# Patient Record
Sex: FEMALE | Race: WHITE | Employment: OTHER | URBAN - NONMETROPOLITAN AREA
[De-identification: names, ages, dates, MRNs, and addresses within clinical notes are randomized per-mention and may not be internally consistent; named-entity substitution may affect disease eponyms.]

---

## 2023-01-10 ENCOUNTER — HOSPITAL ENCOUNTER (EMERGENCY)
Age: 68
Discharge: HOME | DRG: 641 | End: 2023-01-10
Attending: EMERGENCY MEDICINE
Payer: MEDICARE

## 2023-01-10 ENCOUNTER — APPOINTMENT (OUTPATIENT)
Dept: GENERAL RADIOLOGY | Age: 68
DRG: 641 | End: 2023-01-10
Payer: MEDICARE

## 2023-01-10 VITALS
RESPIRATION RATE: 14 BRPM | DIASTOLIC BLOOD PRESSURE: 73 MMHG | TEMPERATURE: 98.7 F | SYSTOLIC BLOOD PRESSURE: 122 MMHG | WEIGHT: 145 LBS | BODY MASS INDEX: 21.41 KG/M2 | HEART RATE: 75 BPM | OXYGEN SATURATION: 98 %

## 2023-01-10 DIAGNOSIS — S42.212A CLOSED FRACTURE OF NECK OF LEFT HUMERUS, INITIAL ENCOUNTER: Primary | ICD-10-CM

## 2023-01-10 PROCEDURE — 6360000002 HC RX W HCPCS

## 2023-01-10 PROCEDURE — 23600 CLTX PROX HUMRL FX W/O MNPJ: CPT

## 2023-01-10 PROCEDURE — 6370000000 HC RX 637 (ALT 250 FOR IP): Performed by: EMERGENCY MEDICINE

## 2023-01-10 PROCEDURE — 96374 THER/PROPH/DIAG INJ IV PUSH: CPT

## 2023-01-10 PROCEDURE — 73030 X-RAY EXAM OF SHOULDER: CPT

## 2023-01-10 PROCEDURE — 99284 EMERGENCY DEPT VISIT MOD MDM: CPT

## 2023-01-10 PROCEDURE — 99205 OFFICE O/P NEW HI 60 MIN: CPT

## 2023-01-10 RX ORDER — HYDROCODONE BITARTRATE AND ACETAMINOPHEN 5; 325 MG/1; MG/1
1 TABLET ORAL ONCE
Status: DISCONTINUED | OUTPATIENT
Start: 2023-01-10 | End: 2023-01-10

## 2023-01-10 RX ORDER — FENTANYL CITRATE 50 UG/ML
50 INJECTION, SOLUTION INTRAMUSCULAR; INTRAVENOUS ONCE
Status: COMPLETED | OUTPATIENT
Start: 2023-01-10 | End: 2023-01-10

## 2023-01-10 RX ORDER — HYDROCODONE BITARTRATE AND ACETAMINOPHEN 5; 325 MG/1; MG/1
1 TABLET ORAL EVERY 6 HOURS PRN
Status: DISCONTINUED | OUTPATIENT
Start: 2023-01-10 | End: 2023-01-11 | Stop reason: HOSPADM

## 2023-01-10 RX ORDER — HYDROCODONE BITARTRATE AND ACETAMINOPHEN 5; 325 MG/1; MG/1
1 TABLET ORAL EVERY 6 HOURS PRN
Qty: 15 TABLET | Refills: 0 | Status: ON HOLD | OUTPATIENT
Start: 2023-01-10 | End: 2023-01-12 | Stop reason: SINTOL

## 2023-01-10 RX ORDER — HYDROCODONE BITARTRATE AND ACETAMINOPHEN 5; 325 MG/1; MG/1
1 TABLET ORAL EVERY 6 HOURS PRN
Status: DISCONTINUED | OUTPATIENT
Start: 2023-01-10 | End: 2023-01-10

## 2023-01-10 RX ADMIN — FENTANYL CITRATE 50 MCG: 50 INJECTION, SOLUTION INTRAMUSCULAR; INTRAVENOUS at 20:48

## 2023-01-10 RX ADMIN — HYDROCODONE BITARTRATE AND ACETAMINOPHEN 1 TABLET: 5; 325 TABLET ORAL at 21:25

## 2023-01-10 ASSESSMENT — PAIN - FUNCTIONAL ASSESSMENT
PAIN_FUNCTIONAL_ASSESSMENT: 0-10
PAIN_FUNCTIONAL_ASSESSMENT: PREVENTS OR INTERFERES SOME ACTIVE ACTIVITIES AND ADLS
PAIN_FUNCTIONAL_ASSESSMENT: 0-10

## 2023-01-10 ASSESSMENT — PAIN SCALES - GENERAL
PAINLEVEL_OUTOF10: 9
PAINLEVEL_OUTOF10: 10
PAINLEVEL_OUTOF10: 8
PAINLEVEL_OUTOF10: 10

## 2023-01-10 ASSESSMENT — ENCOUNTER SYMPTOMS
VOMITING: 0
CONSTIPATION: 0
NAUSEA: 0
BLOOD IN STOOL: 0
ABDOMINAL PAIN: 0
RHINORRHEA: 0
SHORTNESS OF BREATH: 0
WHEEZING: 0
COUGH: 0
DIARRHEA: 0
EYE PAIN: 0

## 2023-01-10 ASSESSMENT — PAIN DESCRIPTION - ORIENTATION
ORIENTATION: LEFT
ORIENTATION: LEFT

## 2023-01-10 ASSESSMENT — PAIN DESCRIPTION - DESCRIPTORS: DESCRIPTORS: ACHING

## 2023-01-10 ASSESSMENT — PAIN DESCRIPTION - LOCATION
LOCATION: SHOULDER
LOCATION: SHOULDER

## 2023-01-10 ASSESSMENT — PAIN DESCRIPTION - FREQUENCY: FREQUENCY: CONTINUOUS

## 2023-01-10 ASSESSMENT — PAIN DESCRIPTION - PAIN TYPE: TYPE: ACUTE PAIN

## 2023-01-11 ENCOUNTER — HOSPITAL ENCOUNTER (INPATIENT)
Age: 68
LOS: 2 days | Discharge: HOME | DRG: 641 | End: 2023-01-13
Admitting: INTERNAL MEDICINE
Payer: MEDICARE

## 2023-01-11 ENCOUNTER — APPOINTMENT (OUTPATIENT)
Dept: CT IMAGING | Age: 68
DRG: 641 | End: 2023-01-11
Payer: MEDICARE

## 2023-01-11 ENCOUNTER — APPOINTMENT (OUTPATIENT)
Dept: MRI IMAGING | Age: 68
DRG: 641 | End: 2023-01-11
Payer: MEDICARE

## 2023-01-11 DIAGNOSIS — D50.9 IRON DEFICIENCY ANEMIA, UNSPECIFIED IRON DEFICIENCY ANEMIA TYPE: ICD-10-CM

## 2023-01-11 DIAGNOSIS — E87.1 HYPONATREMIA: Primary | ICD-10-CM

## 2023-01-11 DIAGNOSIS — R56.9 SEIZURE-LIKE ACTIVITY (HCC): ICD-10-CM

## 2023-01-11 PROBLEM — E87.20 METABOLIC ACIDOSIS: Status: ACTIVE | Noted: 2023-01-11

## 2023-01-11 LAB
AMPHETAMINE+METHAMPHETAMINE URINE SCREEN: NEGATIVE
ANION GAP SERPL CALCULATED.3IONS-SCNC: 16 MEQ/L (ref 8–16)
BACTERIA: ABNORMAL
BARBITURATE QUANTITATIVE URINE: NEGATIVE
BASOPHILS # BLD: 0.2 %
BASOPHILS ABSOLUTE: 0 THOU/MM3 (ref 0–0.1)
BENZODIAZEPINE QUANTITATIVE URINE: NEGATIVE
BILIRUBIN URINE: NEGATIVE
BLOOD, URINE: NEGATIVE
BUN BLDV-MCNC: 11 MG/DL (ref 7–22)
CALCIUM SERPL-MCNC: 8.7 MG/DL (ref 8.5–10.5)
CANNABINOID QUANTITATIVE URINE: NEGATIVE
CASTS: ABNORMAL /LPF
CASTS: ABNORMAL /LPF
CHARACTER, URINE: CLEAR
CHLORIDE BLD-SCNC: 81 MEQ/L (ref 98–111)
CO2: 22 MEQ/L (ref 23–33)
COCAINE METABOLITE QUANTITATIVE URINE: NEGATIVE
COLOR: YELLOW
CREAT SERPL-MCNC: 0.4 MG/DL (ref 0.4–1.2)
CREATININE URINE: 44.8 MG/DL
CRYSTALS: ABNORMAL
EOSINOPHIL # BLD: 0.1 %
EOSINOPHILS ABSOLUTE: 0 THOU/MM3 (ref 0–0.4)
EPITHELIAL CELLS, UA: ABNORMAL /HPF
ERYTHROCYTE [DISTWIDTH] IN BLOOD BY AUTOMATED COUNT: 12.1 % (ref 11.5–14.5)
ERYTHROCYTE [DISTWIDTH] IN BLOOD BY AUTOMATED COUNT: 42.5 FL (ref 35–45)
FENTANYL: POSITIVE
GFR SERPL CREATININE-BSD FRML MDRD: > 60 ML/MIN/1.73M2
GLUCOSE BLD-MCNC: 106 MG/DL (ref 70–108)
GLUCOSE, URINE: NEGATIVE MG/DL
HCT VFR BLD CALC: 32.7 % (ref 37–47)
HEMOGLOBIN: 11.7 GM/DL (ref 12–16)
IMMATURE GRANS (ABS): 0.1 THOU/MM3 (ref 0–0.07)
IMMATURE GRANULOCYTES: 1 %
KETONES, URINE: 80
LEUKOCYTE ESTERASE, URINE: ABNORMAL
LYMPHOCYTES # BLD: 6.5 %
LYMPHOCYTES ABSOLUTE: 0.6 THOU/MM3 (ref 1–4.8)
MCH RBC QN AUTO: 34.1 PG (ref 26–33)
MCHC RBC AUTO-ENTMCNC: 35.8 GM/DL (ref 32.2–35.5)
MCV RBC AUTO: 95.3 FL (ref 81–99)
MISCELLANEOUS LAB TEST RESULT: ABNORMAL
MONOCYTES # BLD: 6.9 %
MONOCYTES ABSOLUTE: 0.7 THOU/MM3 (ref 0.4–1.3)
NITRITE, URINE: NEGATIVE
NUCLEATED RED BLOOD CELLS: 0 /100 WBC
OPIATES, URINE: NEGATIVE
OSMOLALITY CALCULATION: 240.2 MOSMOL/KG (ref 275–300)
OSMOLALITY URINE: 351 MOSMOL/KG (ref 250–750)
OSMOLALITY URINE: 598 MOSMOL/KG (ref 250–750)
OSMOLALITY: 256 MOSMOL/KG (ref 275–295)
OXYCODONE: NEGATIVE
PH UA: 5 (ref 5–9)
PHENCYCLIDINE QUANTITATIVE URINE: NEGATIVE
PLATELET # BLD: 303 THOU/MM3 (ref 130–400)
PMV BLD AUTO: 9.6 FL (ref 9.4–12.4)
POTASSIUM SERPL-SCNC: 3.6 MEQ/L (ref 3.5–5.2)
PROTEIN UA: NEGATIVE MG/DL
RBC # BLD: 3.43 MILL/MM3 (ref 4.2–5.4)
RBC URINE: ABNORMAL /HPF
RENAL EPITHELIAL, UA: ABNORMAL
SEG NEUTROPHILS: 85.3 %
SEGMENTED NEUTROPHILS ABSOLUTE COUNT: 8.4 THOU/MM3 (ref 1.8–7.7)
SODIUM BLD-SCNC: 119 MEQ/L (ref 135–145)
SODIUM BLD-SCNC: 121 MEQ/L (ref 135–145)
SODIUM BLD-SCNC: 121 MEQ/L (ref 135–145)
SODIUM BLD-SCNC: 122 MEQ/L (ref 135–145)
SODIUM BLD-SCNC: 122 MEQ/L (ref 135–145)
SODIUM URINE: 35 MEQ/L
SODIUM URINE: 40 MEQ/L
SPECIFIC GRAVITY UA: 1.02 (ref 1–1.03)
TOTAL CK: 103 U/L (ref 30–135)
TROPONIN T: < 0.01 NG/ML
TSH SERPL DL<=0.05 MIU/L-ACNC: 2.25 UIU/ML (ref 0.4–4.2)
UROBILINOGEN, URINE: 0.2 EU/DL (ref 0–1)
WBC # BLD: 9.9 THOU/MM3 (ref 4.8–10.8)
WBC UA: ABNORMAL /HPF
YEAST: ABNORMAL

## 2023-01-11 PROCEDURE — 6360000002 HC RX W HCPCS

## 2023-01-11 PROCEDURE — 82550 ASSAY OF CK (CPK): CPT

## 2023-01-11 PROCEDURE — 93005 ELECTROCARDIOGRAM TRACING: CPT | Performed by: NURSE PRACTITIONER

## 2023-01-11 PROCEDURE — 6360000002 HC RX W HCPCS: Performed by: SOCIAL WORKER

## 2023-01-11 PROCEDURE — 83930 ASSAY OF BLOOD OSMOLALITY: CPT

## 2023-01-11 PROCEDURE — 84300 ASSAY OF URINE SODIUM: CPT

## 2023-01-11 PROCEDURE — 84484 ASSAY OF TROPONIN QUANT: CPT

## 2023-01-11 PROCEDURE — 99223 1ST HOSP IP/OBS HIGH 75: CPT

## 2023-01-11 PROCEDURE — 84443 ASSAY THYROID STIM HORMONE: CPT

## 2023-01-11 PROCEDURE — 93010 ELECTROCARDIOGRAM REPORT: CPT | Performed by: NUCLEAR MEDICINE

## 2023-01-11 PROCEDURE — 6820000001 HC L2 TRAUMA SURGERY EVALUATION: Performed by: SURGERY

## 2023-01-11 PROCEDURE — 84295 ASSAY OF SERUM SODIUM: CPT

## 2023-01-11 PROCEDURE — 99285 EMERGENCY DEPT VISIT HI MDM: CPT

## 2023-01-11 PROCEDURE — 82570 ASSAY OF URINE CREATININE: CPT

## 2023-01-11 PROCEDURE — 80048 BASIC METABOLIC PNL TOTAL CA: CPT

## 2023-01-11 PROCEDURE — 2580000003 HC RX 258: Performed by: NURSE PRACTITIONER

## 2023-01-11 PROCEDURE — 2580000003 HC RX 258

## 2023-01-11 PROCEDURE — 6370000000 HC RX 637 (ALT 250 FOR IP)

## 2023-01-11 PROCEDURE — 81001 URINALYSIS AUTO W/SCOPE: CPT

## 2023-01-11 PROCEDURE — 2580000003 HC RX 258: Performed by: SOCIAL WORKER

## 2023-01-11 PROCEDURE — 70553 MRI BRAIN STEM W/O & W/DYE: CPT

## 2023-01-11 PROCEDURE — 95819 EEG AWAKE AND ASLEEP: CPT

## 2023-01-11 PROCEDURE — 36415 COLL VENOUS BLD VENIPUNCTURE: CPT

## 2023-01-11 PROCEDURE — 6360000004 HC RX CONTRAST MEDICATION: Performed by: SOCIAL WORKER

## 2023-01-11 PROCEDURE — A9579 GAD-BASE MR CONTRAST NOS,1ML: HCPCS | Performed by: SOCIAL WORKER

## 2023-01-11 PROCEDURE — 85025 COMPLETE CBC W/AUTO DIFF WBC: CPT

## 2023-01-11 PROCEDURE — 83935 ASSAY OF URINE OSMOLALITY: CPT

## 2023-01-11 PROCEDURE — 1200000003 HC TELEMETRY R&B

## 2023-01-11 PROCEDURE — 70450 CT HEAD/BRAIN W/O DYE: CPT

## 2023-01-11 PROCEDURE — 80307 DRUG TEST PRSMV CHEM ANLYZR: CPT

## 2023-01-11 PROCEDURE — 99223 1ST HOSP IP/OBS HIGH 75: CPT | Performed by: INTERNAL MEDICINE

## 2023-01-11 RX ORDER — POLYETHYLENE GLYCOL 3350 17 G/17G
17 POWDER, FOR SOLUTION ORAL DAILY PRN
Status: DISCONTINUED | OUTPATIENT
Start: 2023-01-11 | End: 2023-01-13 | Stop reason: HOSPADM

## 2023-01-11 RX ORDER — SODIUM CHLORIDE 0.9 % (FLUSH) 0.9 %
5-40 SYRINGE (ML) INJECTION EVERY 12 HOURS SCHEDULED
Status: DISCONTINUED | OUTPATIENT
Start: 2023-01-11 | End: 2023-01-13 | Stop reason: HOSPADM

## 2023-01-11 RX ORDER — SODIUM CHLORIDE 9 MG/ML
INJECTION, SOLUTION INTRAVENOUS CONTINUOUS
Status: DISCONTINUED | OUTPATIENT
Start: 2023-01-11 | End: 2023-01-11

## 2023-01-11 RX ORDER — SODIUM CHLORIDE 0.9 % (FLUSH) 0.9 %
5-40 SYRINGE (ML) INJECTION PRN
Status: DISCONTINUED | OUTPATIENT
Start: 2023-01-11 | End: 2023-01-13 | Stop reason: HOSPADM

## 2023-01-11 RX ORDER — LOSARTAN POTASSIUM AND HYDROCHLOROTHIAZIDE 25; 100 MG/1; MG/1
1 TABLET ORAL DAILY
Status: DISCONTINUED | OUTPATIENT
Start: 2023-01-11 | End: 2023-01-12

## 2023-01-11 RX ORDER — POTASSIUM CHLORIDE 20 MEQ/1
40 TABLET, EXTENDED RELEASE ORAL PRN
Status: DISCONTINUED | OUTPATIENT
Start: 2023-01-11 | End: 2023-01-13 | Stop reason: HOSPADM

## 2023-01-11 RX ORDER — ENOXAPARIN SODIUM 100 MG/ML
40 INJECTION SUBCUTANEOUS EVERY 24 HOURS
Status: DISCONTINUED | OUTPATIENT
Start: 2023-01-11 | End: 2023-01-13 | Stop reason: HOSPADM

## 2023-01-11 RX ORDER — CETIRIZINE HYDROCHLORIDE 10 MG/1
10 TABLET ORAL DAILY
Status: DISCONTINUED | OUTPATIENT
Start: 2023-01-11 | End: 2023-01-13 | Stop reason: HOSPADM

## 2023-01-11 RX ORDER — SODIUM CHLORIDE 9 MG/ML
INJECTION, SOLUTION INTRAVENOUS PRN
Status: DISCONTINUED | OUTPATIENT
Start: 2023-01-11 | End: 2023-01-13 | Stop reason: HOSPADM

## 2023-01-11 RX ORDER — ACETAMINOPHEN 325 MG/1
650 TABLET ORAL EVERY 6 HOURS PRN
Status: DISCONTINUED | OUTPATIENT
Start: 2023-01-11 | End: 2023-01-13 | Stop reason: HOSPADM

## 2023-01-11 RX ORDER — ASPIRIN 81 MG/1
81 TABLET ORAL DAILY
Status: DISCONTINUED | OUTPATIENT
Start: 2023-01-11 | End: 2023-01-13 | Stop reason: HOSPADM

## 2023-01-11 RX ORDER — MAGNESIUM SULFATE IN WATER 40 MG/ML
2000 INJECTION, SOLUTION INTRAVENOUS PRN
Status: DISCONTINUED | OUTPATIENT
Start: 2023-01-11 | End: 2023-01-13 | Stop reason: HOSPADM

## 2023-01-11 RX ORDER — ONDANSETRON 2 MG/ML
4 INJECTION INTRAMUSCULAR; INTRAVENOUS EVERY 6 HOURS PRN
Status: DISCONTINUED | OUTPATIENT
Start: 2023-01-11 | End: 2023-01-13 | Stop reason: HOSPADM

## 2023-01-11 RX ORDER — CARVEDILOL 3.12 MG/1
3.12 TABLET ORAL 2 TIMES DAILY WITH MEALS
Status: DISCONTINUED | OUTPATIENT
Start: 2023-01-11 | End: 2023-01-13 | Stop reason: HOSPADM

## 2023-01-11 RX ORDER — FERROUS SULFATE 325(65) MG
325 TABLET ORAL
Status: DISCONTINUED | OUTPATIENT
Start: 2023-01-11 | End: 2023-01-13 | Stop reason: HOSPADM

## 2023-01-11 RX ORDER — ATORVASTATIN CALCIUM 20 MG/1
20 TABLET, FILM COATED ORAL NIGHTLY
Status: DISCONTINUED | OUTPATIENT
Start: 2023-01-11 | End: 2023-01-13 | Stop reason: HOSPADM

## 2023-01-11 RX ORDER — ISOSORBIDE MONONITRATE 30 MG/1
15 TABLET, EXTENDED RELEASE ORAL DAILY
Status: DISCONTINUED | OUTPATIENT
Start: 2023-01-11 | End: 2023-01-13 | Stop reason: HOSPADM

## 2023-01-11 RX ORDER — SODIUM CHLORIDE 9 MG/ML
INJECTION, SOLUTION INTRAVENOUS CONTINUOUS
Status: DISCONTINUED | OUTPATIENT
Start: 2023-01-11 | End: 2023-01-12

## 2023-01-11 RX ORDER — LORAZEPAM 2 MG/ML
1 INJECTION INTRAMUSCULAR EVERY 5 MIN PRN
Status: DISCONTINUED | OUTPATIENT
Start: 2023-01-11 | End: 2023-01-13 | Stop reason: HOSPADM

## 2023-01-11 RX ORDER — POTASSIUM CHLORIDE 7.45 MG/ML
10 INJECTION INTRAVENOUS PRN
Status: DISCONTINUED | OUTPATIENT
Start: 2023-01-11 | End: 2023-01-13 | Stop reason: HOSPADM

## 2023-01-11 RX ORDER — KETOROLAC TROMETHAMINE 30 MG/ML
15 INJECTION, SOLUTION INTRAMUSCULAR; INTRAVENOUS EVERY 6 HOURS PRN
Status: DISCONTINUED | OUTPATIENT
Start: 2023-01-11 | End: 2023-01-13 | Stop reason: HOSPADM

## 2023-01-11 RX ORDER — ACETAMINOPHEN 650 MG/1
650 SUPPOSITORY RECTAL EVERY 6 HOURS PRN
Status: DISCONTINUED | OUTPATIENT
Start: 2023-01-11 | End: 2023-01-13 | Stop reason: HOSPADM

## 2023-01-11 RX ORDER — ONDANSETRON 4 MG/1
4 TABLET, ORALLY DISINTEGRATING ORAL EVERY 8 HOURS PRN
Status: DISCONTINUED | OUTPATIENT
Start: 2023-01-11 | End: 2023-01-13 | Stop reason: HOSPADM

## 2023-01-11 RX ADMIN — ACETAMINOPHEN 650 MG: 325 TABLET ORAL at 20:31

## 2023-01-11 RX ADMIN — GADOTERIDOL 15 ML: 279.3 INJECTION, SOLUTION INTRAVENOUS at 17:27

## 2023-01-11 RX ADMIN — KETOROLAC TROMETHAMINE 15 MG: 30 INJECTION, SOLUTION INTRAMUSCULAR; INTRAVENOUS at 15:57

## 2023-01-11 RX ADMIN — ISOSORBIDE MONONITRATE 15 MG: 30 TABLET, EXTENDED RELEASE ORAL at 18:07

## 2023-01-11 RX ADMIN — FERROUS SULFATE TAB 325 MG (65 MG ELEMENTAL FE) 325 MG: 325 (65 FE) TAB at 20:31

## 2023-01-11 RX ADMIN — CARVEDILOL 3.12 MG: 3.12 TABLET, FILM COATED ORAL at 18:07

## 2023-01-11 RX ADMIN — LEVETIRACETAM 1250 MG: 100 INJECTION, SOLUTION INTRAVENOUS at 20:43

## 2023-01-11 RX ADMIN — SODIUM CHLORIDE: 9 INJECTION, SOLUTION INTRAVENOUS at 11:47

## 2023-01-11 RX ADMIN — ENOXAPARIN SODIUM 40 MG: 100 INJECTION SUBCUTANEOUS at 18:07

## 2023-01-11 RX ADMIN — SODIUM CHLORIDE: 9 INJECTION, SOLUTION INTRAVENOUS at 17:49

## 2023-01-11 RX ADMIN — ASPIRIN 81 MG: 81 TABLET, COATED ORAL at 18:07

## 2023-01-11 ASSESSMENT — PAIN DESCRIPTION - LOCATION
LOCATION: SHOULDER;BACK
LOCATION: SHOULDER
LOCATION: SHOULDER

## 2023-01-11 ASSESSMENT — ENCOUNTER SYMPTOMS
SHORTNESS OF BREATH: 0
TROUBLE SWALLOWING: 0
WHEEZING: 0
CHEST TIGHTNESS: 0
NAUSEA: 0
ABDOMINAL PAIN: 0
COUGH: 0
CONSTIPATION: 0
ABDOMINAL DISTENTION: 0
STRIDOR: 0
COLOR CHANGE: 0
VOMITING: 0
PHOTOPHOBIA: 0
BACK PAIN: 1
DIARRHEA: 0
NAUSEA: 1

## 2023-01-11 ASSESSMENT — PAIN - FUNCTIONAL ASSESSMENT
PAIN_FUNCTIONAL_ASSESSMENT: NONE - DENIES PAIN
PAIN_FUNCTIONAL_ASSESSMENT: PREVENTS OR INTERFERES SOME ACTIVE ACTIVITIES AND ADLS
PAIN_FUNCTIONAL_ASSESSMENT: 0-10

## 2023-01-11 ASSESSMENT — PAIN DESCRIPTION - DESCRIPTORS
DESCRIPTORS: ACHING
DESCRIPTORS: SHARP;SORE

## 2023-01-11 ASSESSMENT — PAIN DESCRIPTION - ORIENTATION
ORIENTATION: LEFT

## 2023-01-11 ASSESSMENT — PAIN SCALES - GENERAL
PAINLEVEL_OUTOF10: 9
PAINLEVEL_OUTOF10: 0
PAINLEVEL_OUTOF10: 7
PAINLEVEL_OUTOF10: 8

## 2023-01-11 NOTE — PROGRESS NOTES
[facility] Laboratory Technician Worksheet      EEG Date: 2023    Name: Carole Cheney   : 1955   Age: 74 y.o. SEX: female    ROOM: 4A0 MRN: [id number]           CSN: [id number]      Ordering Provider: TRACI rodriguez  EEG Number: [id number] Time of Test:  0002    Hand: Right   Sedation: no    H.V. Done: No  age protocol  Photic: Yes    Sleep: Yes  Drowsy: Yes   Sleep Deprived: No    Seizures observed: no    Mentality: alert      Clinical History:patient had an episode where she went stiff and bit tongue, episode lasted about 5-10 mins according to family and was confused 15-20 mins afterward. Patient had a fall yesterday and broke left shoulder. CT of the head 2023  Impression    No evidence of an acute process.    MRI of the brain-  Pending       Past Medical History:       Diagnosis Date    Anemia     Heart disease     Hypertension        Scheduled Meds:   aspirin  81 mg Oral Daily    carvedilol  3.125 mg Oral BID WC    ferrous sulfate  325 mg Oral Once per day on     cetirizine  10 mg Oral Daily    [Held by provider] losartan-hydroCHLOROthiazide  1 tablet Oral Daily    isosorbide mononitrate  15 mg Oral Daily    sodium chloride flush  5-40 mL IntraVENous 2 times per day    enoxaparin  40 mg SubCUTAneous Q24H    atorvastatin  20 mg Oral Nightly     Continuous Infusions:   sodium chloride Stopped (23 1539)    sodium chloride       PRN Meds:.sodium chloride flush, sodium chloride, ondansetron **OR** ondansetron, polyethylene glycol, acetaminophen **OR** acetaminophen, potassium chloride **OR** potassium alternative oral replacement **OR** potassium chloride, magnesium sulfate, ketorolac, LORazepam    Technician: Jody Chawla 2023

## 2023-01-11 NOTE — CONSULTS
Neurology Consult Note    Date:1/11/2023       ERHX:[id number]  Patient Margarette Chong     YOB: 1955     Age:67 y.o. Requesting Physician: SIVA Yeh*     Reason for Consult:  Evaluate for Seizure      Chief Complaint: Loss of consciousness, stiffening episode    Subjective     Chandu Hernández is a 56-year-old  female with past medical history significant for anemia, heart disease, hypertension and a fracture of her left humerus on 1/10/2023 who presented to [facility] emergency department after an episode in which she was observed by her  to have a seizure.  states that they were sleeping in the recliners and he first heard her icepack fall on the floor, when he lifted her she was lying with her right arm and right leg straightened and stiff with eyes rolled back in her head and unresponsive to him. He states that this episode lasted for 5 to 10 minutes and was followed by a 20 to 30-minute period of confusion, weakness. Patient did not lose control of her bowel/bladder, she denies muscle soreness but she does have a right lateral tongue bite.  also states that about a year ago patient was observed to have an episode of unilateral shaking and loss of responsiveness while she was cleaning her blinds. This lasted for only seconds and she was back to normal quickly after that episode so she did not seek evaluation at that time. Patient denies recent illness or head trauma. ED course was significant for finding of hyponatremia to 119 on arrival.      Review of Systems   Review of Systems   Constitutional:  Negative for activity change, fatigue and fever. HENT:  Negative for tinnitus and trouble swallowing. Eyes:  Negative for photophobia and visual disturbance. Respiratory:  Negative for cough and shortness of breath. Cardiovascular:  Negative for chest pain and palpitations.    Gastrointestinal:  Negative for diarrhea, nausea and vomiting. Genitourinary:  Negative for dysuria and flank pain. Musculoskeletal:  Positive for arthralgias and joint swelling. Negative for neck pain and neck stiffness. Skin:  Negative for color change and rash. Neurological:  Positive for seizures and weakness. Negative for dizziness, facial asymmetry, speech difficulty, numbness and headaches. Psychiatric/Behavioral:  Positive for confusion. Negative for agitation. Medications   Scheduled Meds:    aspirin  81 mg Oral Daily    carvedilol  3.125 mg Oral BID WC    ferrous sulfate  325 mg Oral Once per day on Mon Wed Fri    cetirizine  10 mg Oral Daily    [Held by provider] losartan-hydroCHLOROthiazide  1 tablet Oral Daily    isosorbide mononitrate  15 mg Oral Daily    sodium chloride flush  5-40 mL IntraVENous 2 times per day    enoxaparin  40 mg SubCUTAneous Q24H    atorvastatin  20 mg Oral Nightly     Continuous Infusions:    sodium chloride 50 mL/hr at 01/11/23 1749    sodium chloride       PRN Meds: sodium chloride flush, sodium chloride, ondansetron **OR** ondansetron, polyethylene glycol, acetaminophen **OR** acetaminophen, potassium chloride **OR** potassium alternative oral replacement **OR** potassium chloride, magnesium sulfate, ketorolac, LORazepam    Past History    Past Medical History:   has a past medical history of Anemia, Heart disease, and Hypertension. Social History:   reports that she quit smoking about 9 years ago. Her smoking use included cigarettes. She has a 15.00 pack-year smoking history. She has never used smokeless tobacco. She reports current alcohol use. She reports that she does not use drugs.      Family History:   Family History   Problem Relation Age of Onset    Heart Disease Father     Arthritis Mother     High Blood Pressure Mother     Rectal Cancer Mother 80       Physical Examination      Vitals:  BP (!) 140/80   Pulse 85   Temp 98.4 °F (36.9 °C) (Oral)   Resp 15   Ht 5' 8\" (1.727 m) Wt 145 lb (65.8 kg)   SpO2 100%   BMI 22.05 kg/m²   Temp (24hrs), Av.3 °F (36.8 °C), Min:97.8 °F (36.6 °C), Max:98.7 °F (37.1 °C)      I/O (24Hr): No intake or output data in the 24 hours ending 236    Physical Exam  Vitals reviewed. Constitutional:       Appearance: Normal appearance. HENT:      Head: Normocephalic and atraumatic. Right Ear: External ear normal.      Left Ear: External ear normal.      Nose: Nose normal.      Mouth/Throat:      Mouth: Mucous membranes are moist.      Pharynx: Oropharynx is clear. Eyes:      Extraocular Movements: Extraocular movements intact and EOM normal.      Pupils: Pupils are equal, round, and reactive to light. Cardiovascular:      Rate and Rhythm: Normal rate and regular rhythm. Pulmonary:      Effort: Pulmonary effort is normal. No respiratory distress. Abdominal:      General: There is no distension. Palpations: Abdomen is soft. Tenderness: There is no abdominal tenderness. Musculoskeletal:         General: Swelling, tenderness and signs of injury present. No deformity. Cervical back: No rigidity or tenderness. Comments: LUE humerus   Skin:     General: Skin is warm and dry. Neurological:      Mental Status: She is alert and oriented to person, place, and time. Coordination: Heel to Shin Test normal. Finger-nose-finger test: Normal on right, deferred on left. Gait: Gait is intact. Deep Tendon Reflexes:      Reflex Scores:       Bicep reflexes are 2+ on the right side. Brachioradialis reflexes are 2+ on the right side and 2+ on the left side. Patellar reflexes are 2+ on the right side and 2+ on the left side. Psychiatric:         Mood and Affect: Mood normal.         Speech: Speech normal.         Behavior: Behavior normal.         Thought Content: Thought content normal.     Neurologic Exam     Mental Status   Oriented to person, place, and time. Follows 2 step commands.    Attention: normal.   Speech: speech is normal   Level of consciousness: alert  Knowledge: good. Able to name object. Able to read. Able to repeat. Cranial Nerves     CN II   Visual fields full to confrontation. CN III, IV, VI   Pupils are equal, round, and reactive to light. Extraocular motions are normal.     CN V   Facial sensation intact. CN VII   Facial expression full, symmetric. CN VIII   CN VIII normal.   Hearing: intact    CN IX, X   CN IX normal.   Palate: symmetric    CN XI   CN XI normal.   Right sternocleidomastoid strength: normal  Left sternocleidomastoid strength: normal  Right trapezius strength: normal  Left trapezius strength: normal    CN XII   CN XII normal.   Tongue: not atrophic    Motor Exam   Muscle bulk: normal  Overall muscle tone: normal  Strength:  RUE 5/5  LUE 5/5 , other strength testing deferred due to humeral fracture. BLE 5/5  No abnormal motor movement     Sensory Exam   Light touch normal.     Gait, Coordination, and Reflexes     Gait  Gait: normal    Coordination   Finger-nose-finger test: Normal on right, deferred on left. Heel to shin coordination: normal    Tremor   Resting tremor: absent  Intention tremor: absent  Action tremor: absent    Reflexes   Right brachioradialis: 2+  Left brachioradialis: 2+  Right biceps: 2+  Right patellar: 2+  Left patellar: 2+     Labs/Imaging/Diagnostics   Labs:  CBC:  Recent Labs     01/11/23  0930   WBC 9.9   RBC 3.43*   HGB 11.7*   HCT 32.7*   MCV 95.3        CHEMISTRIES:  Recent Labs     01/11/23  0930 01/11/23  1500   * 122*   K 3.6  --    CL 81*  --    CO2 22*  --    BUN 11  --    CREATININE 0.4  --    GLUCOSE 106  --      PT/INR:No results for input(s): PROTIME, INR in the last 72 hours. APTT:No results for input(s): APTT in the last 72 hours. LIVER PROFILE:No results for input(s): AST, ALT, BILIDIR, BILITOT, ALKPHOS in the last 72 hours.     Imaging Last 24 Hours:  CT HEAD WO CONTRAST    Result Date: 1/11/2023  PROCEDURE: CT HEAD WO CONTRAST CLINICAL INFORMATION: Siecure like activity. Negin Randolph. COMPARISON: No prior study. TECHNIQUE: Noncontrast 5 mm axial images were obtained through the brain. Sagittal and coronal reconstructions were obtained. All CT scans at this facility use dose modulation, iterative reconstruction, and/or weight-based dosing when appropriate to reduce radiation dose to as low as reasonably achievable. FINDINGS: The brain volume is within acceptable limits. There is no hemorrhage. There are no intra-or extra-axial collections. There is no hydrocephalus, midline shift or mass effect. The gray-white matter differentiation is preserved. The paranasal sinuses and mastoid air cells are normally aerated. There is no suspicious calvarial abnormality. No evidence of an acute process. **This report has been created using voice recognition software. It may contain minor errors which are inherent in voice recognition technology. ** Final report electronically signed by Dr. Cecil Fox on 1/11/2023 11:16 AM    XR SHOULDER LEFT (MIN 2 VIEWS)    Result Date: 1/10/2023  PROCEDURE: XR SHOULDER LEFT (MIN 2 VIEWS) CLINICAL INFORMATION: Injury, Pain. COMPARISON: No prior study. TECHNIQUE: 3 views of the shoulder including AP view, a glenoid view, and a scapular Y view. FINDINGS: There is a comminuted fracture involving the head and neck of the humerus. There is degenerative change involving the acromioclavicular joint. The remaining bony structures are intact. The clavicle is normal.  The soft tissues are normal.  There are no suspicious findings in the visualized aspects of the upper lung. 1. Comminuted fracture involving the head and neck of the left humerus. 2. Degenerative change involving the acromioclavicular joint. **This report has been created using voice recognition software. It may contain minor errors which are inherent in voice recognition technology. ** Final report electronically signed by DR Adelina Mcdermott on 1/10/2023 7:40 PM    MRI BRAIN W WO CONTRAST    Result Date: 1/11/2023  PROCEDURE: MRI BRAIN W WO CONTRAST CLINICAL INFORMATIONNew Onset Seizure. COMPARISON: CT scan of the brain dated 11 January 2023. TECHNIQUE: Multiplanar and multiple spin echo T1 and T2-weighted images were obtained through the brain before and after the administration of intravenous contrast. FINDINGS: The diffusion-weighted images are normal. The brain volume is reduced. There are no intra-or extra-axial collections. There is no hydrocephalus, midline shift or mass effect. On the FLAIR and T2-weighted sequences, there is no structural abnormality noted in the temporal lobes. There are punctate areas of increased signal intensity in the white matter most likely representing ischemic changes. . On the gradient echo T2-weighted images, there are no areas of susceptibility artifact noted There is no abnormal enhancement in the brain. The major intracranial vascular flow voids are present. The midline craniocervical junction structures are normal.  The brainstem and pituitary gland are normal.     1. No evidence for an acute infarct. 2. Mild atrophy and probable ischemic changes in the white matter. 3. No definite structural abnormality noted and temporal lobes. **This report has been created using voice recognition software. It may contain minor errors which are inherent in voice recognition technology. ** Final report electronically signed by DR Adelina Mcdermott on 1/11/2023 5:36 PM        Assessment and Plan:        Hospital Problems             Last Modified POA    * (Principal) Seizure ([facility] [address] 1/11/2023 Yes       Seizure, recurrent  CTH no ICH, midline shift, mass-effect  MRI brain with and without contrast-no acute infarct, enhancing lesions, temporal lobes appear normal  EEG completed pending read  Keppra 1250 mg IV tonight  Keppra 750 mg p.o. starting tomorrow morning  Discussed with pharmacist.  Saul Varma less likely to cause hyponatremia compared to other antiepileptics and is a good option. Hyponatremia managed per nephrology  Recommend infectious work-up per primary team  Neurology following for EEG results  Will need follow up with outpatient general neurology Dr. Tato Penn in 2-4 weeks. Discussed seizure precautions: No driving, climbing ladders, swimming for 6 months or until released by outpatient neurology. Electronically signed by Fiorella Tirado PA-C on 1/11/23 at 6:36 PM EST    I have independently reviewed the hospital record and examined this patient on 1/11/23. I have discussed my findings with Fiorella Tirado PA-C. I have personally seen and examined this patient and the treatment plan was developed by me and outlined in this note by Fiorella Tirado PA-C. The timing of the note filing does not necessarily correlate with the timing of when the patient was seen by me.      Tori Ward MD

## 2023-01-11 NOTE — ED TRIAGE NOTES
Patient to room in wheelchair. C/o left shoulder pain, radiating to left elbow beginning prior to arrival. States her left leg gave out, cause her to fall to the hardwood will in her home. Denies loss of consciousness. Denies head injury.

## 2023-01-11 NOTE — PROGRESS NOTES
Pt admitted to  [address] [place] [address]. Complaints: seizure like activity. IV site free of s/s of infection or infiltration. Vital signs obtained. Assessment and data collection initiated. Two nurse skin assessment performed by Flako Arnold. Oriented to room. Policies and procedures for 4A explained. All questions answered with no further questions at this time. Fall prevention and safety brochure discussed with patient. Bed alarm on. Call light in reach.       ME@ 1/11/2023 3:16 PM

## 2023-01-11 NOTE — ED NOTES
Patient up to [facility] to collect urine sample via assist x2. Patient complains of back pain. Patient back to bed. VSS. Hospitalist at bedside for patient assessment.       Ivonne Hampton RN  01/11/23 [id number]

## 2023-01-11 NOTE — H&P
Hospitalist History & Physical    Patient:  Brittany Smith    Unit/Bed:35/035A  YOB: 1955  MRN: [id number]   Acct: [de-identified]   PCP: SIVA Romero CNP  Code Status: No Order    Date of Service: Pt seen/examined on 01/11/23 and admitted to Inpatient with expected LOS greater than two midnights due to medical therapy. Chief Complaint: Seizure    Assessment/Plan:    Probable Seizure: Head CT negative; noted wound to tongue without pain  Neurology consulted; recommendations appreciated  PRN ativan for seizure control  Seizure precautions    Hyponatremia: Na 119  Hold hyzaar for now  Q4h neuro checks  NS @125; notify if Na raises more than 8 meq per 24hrs  2L fluid restriction  Na q2h check  Nephrology consulted; appreciate recommendations    Left should pain: X-ray (1/10/23) showed comminuted fracture of head and neck of humerus on shoulder x-ray  Pain control with Toradol; avoid narcotics due to neuro checks  Ortho consult when stable. Hypertension: medication controlled. Hold hyzaar    hyperlipidemia: Patient states she only takes her statin once a week per primary cardiologist    Non-anion gap metabolic acidosis: serum bicarb 22; anion gap 16   Daily bmp    ETOH use: Patient endorses 3 alcoholic drinks daily for years. She denies any withdrawal and stated that there have been times that she has quit drinking for days without any ill effects. PAWss score was insignificant. Monitor for signs of withdrawal. May consider CIWAA if needed  Counseled on quitting and not receptive at this time. History of Present Illness:  Brittany Smith is a 74 y.o. female with PMHx of hypertension, hyperlipidemia, and a recent history of left shoulder fracture who presented to [facility] with chief complaint of seizure like activity. This am at 0400 the patients  stated that he was sleeping in the chair next to the patient and she asked for more pain medications.  He got up to go get the pain medications and when he returned the patient was still laying on her chair, but was very stiff and had bit her tongue. She never lost bowel or bladder control, but he was uncertain or not if she had a seizure. After the event the  noted that she was not very \"with it\", but recovered within 15-20 minutes. Subsequently she was brought to the ED. Patient does endorse a history of drinking 3 alcoholic drinks daily, but denies any history of withdrawal.    Review of Systems: Review of Systems   Constitutional:  Negative for chills, diaphoresis and fever. Respiratory:  Negative for shortness of breath, wheezing and stridor. Cardiovascular:  Negative for chest pain. Gastrointestinal:  Positive for nausea. Musculoskeletal:  Positive for neck pain. Negative for gait problem. Neurological:  Negative for tremors, light-headedness, numbness and headaches. Past Medical History:        Diagnosis Date    Anemia     Heart disease     Hypertension        Past Surgical History:        Procedure Laterality Date    CARDIAC CATHETERIZATION  2017       Home Medications:   No current facility-administered medications on file prior to encounter. Current Outpatient Medications on File Prior to Encounter   Medication Sig Dispense Refill    HYDROcodone-acetaminophen (NORCO) 5-325 MG per tablet Take 1 tablet by mouth every 6 hours as needed for Pain for up to 15 doses. Max Daily Amount: 4 tablets 15 tablet 0    atorvastatin (LIPITOR) 20 MG tablet Take 20 mg by mouth daily      carvedilol (COREG) 3.125 MG tablet Take 3.125 mg by mouth 2 times daily (with meals)      Isosorbide Mononitrate (IMDUR PO) Take 15 mg by mouth daily      fexofenadine (ALLEGRA) 180 MG tablet Take 180 mg by mouth daily      losartan-hydrochlorothiazide (HYZAAR) 100-25 MG per tablet Take 1 tablet by mouth daily      polyethylene glycol (GLYCOLAX) powder Colonoscopy Prep Dispense 255 Gram Bottle.   Use as Directed 255 g 0    ferrous sulfate 325 (65 FE) MG EC tablet Take 325 mg by mouth three times a week      naproxen (NAPROSYN) 500 MG tablet Take 1 tablet by mouth 2 times daily for 14 days. 28 tablet 0    vitamin E 400 UNIT capsule Take 400 Units by mouth daily. calcium carbonate (OSCAL) 500 MG TABS tablet Take 600 mg by mouth daily       aspirin 81 MG tablet Take 81 mg by mouth daily. multivitamin (THERAGRAN) per tablet Take 1 tablet by mouth daily. Allergies:    Patient has no known allergies. Social History:    reports that she quit smoking about 9 years ago. Her smoking use included cigarettes. She has a 15.00 pack-year smoking history. She has never used smokeless tobacco. She reports current alcohol use. She reports that she does not use drugs. Family History:       Problem Relation Age of Onset    Heart Disease Father     Arthritis Mother     High Blood Pressure Mother     Rectal Cancer Mother 80       Diet:  No diet orders on file      Physical Exam:  /76   Pulse 82   Temp 97.8 °F (36.6 °C) (Oral)   Resp 20   Ht 5' 9\" (1.753 m)   Wt 145 lb (65.8 kg)   SpO2 98%   BMI 21.41 kg/m²   General:   Non-toxic  HEENT:  normocephalic and atraumatic. No scleral icterus. PERR. +laceration on tongue  Neck: supple. No JVD. No thyromegaly. Lungs: clear to auscultation. No retractions  Cardiac: RRR without murmur. Abdomen: soft. Nontender. Bowel sounds positive. Extremities:  No clubbing, cyanosis, or edema x 4. Left upper extremity in sling  Vasculature: capillary refill < 3 seconds. Palpable LE pulses bilaterally. Skin:  warm and dry. No rashes or lesions. Psych:  Alert and oriented x3. Affect appropriate  Lymph:  No supraclavicular adenopathy. Neurologic:  No focal deficit. No seizures.     Data: (All radiographs, tracings, PFTs, and imaging are personally viewed and interpreted unless otherwise noted)  Labs:   Recent Labs     01/11/23  0930   WBC 9.9   HGB 11.7*   HCT 32.7*        Recent Labs     01/11/23  0930   *   K 3.6   CL 81*   CO2 22*   BUN 11   CREATININE 0.4   CALCIUM 8.7     No results for input(s): AST, ALT, BILIDIR, BILITOT, ALKPHOS in the last 72 hours. No results for input(s): INR in the last 72 hours. Recent Labs     01/11/23  0930   CKTOTAL 103     Urinalysis:   Lab Results   Component Value Date/Time    BLOODU mod 08/04/2014 04:29 PM    SPECGRAV 1.010 08/04/2014 04:29 PM    GLUCOSEU neg 08/04/2014 04:29 PM       EKG: normal sinus rhythm, no blocks or conduction defects, no ischemic changes    Radiology:  CT HEAD WO CONTRAST   Final Result    No evidence of an acute process. **This report has been created using voice recognition software. It may contain minor errors which are inherent in voice recognition technology. **      Final report electronically signed by Dr. Carmenza Paredes on 1/11/2023 11:16 AM        CT HEAD WO CONTRAST    Result Date: 1/11/2023  PROCEDURE: CT HEAD WO CONTRAST CLINICAL INFORMATION: Siecure like activity. Devendra Linda. COMPARISON: No prior study. TECHNIQUE: Noncontrast 5 mm axial images were obtained through the brain. Sagittal and coronal reconstructions were obtained. All CT scans at this facility use dose modulation, iterative reconstruction, and/or weight-based dosing when appropriate to reduce radiation dose to as low as reasonably achievable. FINDINGS: The brain volume is within acceptable limits. There is no hemorrhage. There are no intra-or extra-axial collections. There is no hydrocephalus, midline shift or mass effect. The gray-white matter differentiation is preserved. The paranasal sinuses and mastoid air cells are normally aerated. There is no suspicious calvarial abnormality. No evidence of an acute process. **This report has been created using voice recognition software. It may contain minor errors which are inherent in voice recognition technology. ** Final report electronically signed by Dr. Carmenza Paredes on 1/11/2023 11:16 AM        Tele:   [x] yes             [] no      Thank you SIVA Humphrey CNP for the opportunity to be involved in this patient's care.     Electronically signed by SIVA Card CNP on 1/11/2023 at 12:38 PM

## 2023-01-11 NOTE — ED TRIAGE NOTES
Patient to ED via [facility] c/c seizures. Upon arrival to ED, patient is alert and oriented. Family at bedside reports patient was sitting in a recliner chair when \"her body went straight and stiff and she vomited blood. \" Patient reports no hx of seizures. Was seen here yesterday with a broken and dislocated left shoulder. Patient was scheduled to go to [facility] this afternoon. Patient is quiet. Patient denies pain, feeling fatigued or foggy. States she is trying to figure out what is happening. EKG completed. IV access established and labs collected.

## 2023-01-11 NOTE — ED PROVIDER NOTES
[address] [facility] [address] [address] EMERGENCY DEPT      EMERGENCY MEDICINE     Pt Name: Skylar Aguero  MRN: [id number]  [place] 1955  Date of evaluation: 1/10/2023  Resident Physician: Ever Rodriguez DPM  Supervising Physician: Denilson Rodriguez DO    CHIEF COMPLAINT       Chief Complaint   Patient presents with    Shoulder Pain     Left       HISTORY OF PRESENT ILLNESS   Skylar Aguero is a pleasant 74 y.o. female who presents to the emergency department from as a transfer from, by private vehicle for evaluation of left shoulder pain. States that she fell on her shoulder about 40 minutes ago onto hardwood floor at home. Went to urgent care and was transferred to ED for further evaluation. Denies any head injury or loss of consciousness. States that her sensation is intact, able to move fingers. Xrays taken in urgent care showed comminuted fractures of the head and neck of the humerus. Urgent care attempted to administer sling and swath but patient was unable to tolerate due to pain. Denies any nausea, vomiting, fever, chills, chest pain, or shortness of breath. No other acute complaints. PASTMEDICAL HISTORY     Past Medical History:   Diagnosis Date    Anemia     Heart disease     Hypertension        Patient Active Problem List   Diagnosis Code    Hypertension I10    Family history of early CAD-her father has MI at age 62  Z80.55    Abnormal EKG non specific T wave changes R94.31    Abnormal nuclear stress test- MILD TO MOD APICAL REVERSIBILITY- MED rX FOR AS SHE HAS NO SYMPTOMS R94.39     SURGICAL HISTORY       Past Surgical History:   Procedure Laterality Date    CARDIAC CATHETERIZATION  2017       CURRENT MEDICATIONS       Previous Medications    ASPIRIN 81 MG TABLET    Take 81 mg by mouth daily.     ATORVASTATIN (LIPITOR) 20 MG TABLET    Take 20 mg by mouth daily    CALCIUM CARBONATE (OSCAL) 500 MG TABS TABLET    Take 600 mg by mouth daily     CARVEDILOL (COREG) 3.125 MG TABLET    Take 3.125 mg by mouth 2 times daily (with meals) FERROUS SULFATE 325 (65 FE) MG EC TABLET    Take 325 mg by mouth three times a week    FEXOFENADINE (ALLEGRA) 180 MG TABLET    Take 180 mg by mouth daily    ISOSORBIDE MONONITRATE (IMDUR PO)    Take 15 mg by mouth daily    LOSARTAN-HYDROCHLOROTHIAZIDE (HYZAAR) 100-25 MG PER TABLET    Take 1 tablet by mouth daily    MULTIVITAMIN (THERAGRAN) PER TABLET    Take 1 tablet by mouth daily. NAPROXEN (NAPROSYN) 500 MG TABLET    Take 1 tablet by mouth 2 times daily for 14 days. POLYETHYLENE GLYCOL (GLYCOLAX) POWDER    Colonoscopy Prep Dispense 255 Gram Bottle. Use as Directed    VITAMIN E 400 UNIT CAPSULE    Take 400 Units by mouth daily. ALLERGIES     has No Known Allergies. FAMILY HISTORY     She indicated that her mother is alive. She indicated that her father is . SOCIAL HISTORY       Social History     Tobacco Use    Smoking status: Former     Packs/day: 0.50     Years: 30.00     Pack years: 15.00     Types: Cigarettes     Quit date:      Years since quittin.0    Smokeless tobacco: Never    Tobacco comments:     printed material to AVS and per provider   Substance Use Topics    Alcohol use: Yes     Alcohol/week: 0.0 standard drinks     Comment: occ    Drug use: No       PHYSICAL EXAM       ED Triage Vitals [01/10/23 1859]   BP Temp Temp Source Heart Rate Resp SpO2 Height Weight   (!) 170/87 98.7 °F (37.1 °C) Temporal 76 20 97 % -- 145 lb (65.8 kg)       Additional Vital Signs:  Vitals:    01/10/23 2051   BP: (!) 129/59   Pulse: 77   Resp: 16   Temp:    SpO2: 97%     Physical Exam  Vitals and nursing note reviewed. Constitutional:       Appearance: Normal appearance. HENT:      Head: Normocephalic and atraumatic. Eyes:      Extraocular Movements: Extraocular movements intact. Cardiovascular:      Rate and Rhythm: Normal rate and regular rhythm. Pulmonary:      Effort: Pulmonary effort is normal.      Breath sounds: Normal breath sounds.    Abdominal:      Palpations: Abdomen is soft. Musculoskeletal:      Left shoulder: Swelling and tenderness present. Left upper arm: Deformity and tenderness present. Cervical back: Normal range of motion. Skin:     General: Skin is warm and dry. Capillary Refill: Capillary refill takes less than 2 seconds. Neurological:      Mental Status: She is alert and oriented to person, place, and time. Psychiatric:         Mood and Affect: Mood normal.         Behavior: Behavior normal.       FORMAL DIAGNOSTIC RESULTS     RADIOLOGY: Interpretation per the Radiologist below, if available at the time of this note (none if blank):    XR SHOULDER LEFT (MIN 2 VIEWS)   Final Result       1. Comminuted fracture involving the head and neck of the left humerus. 2. Degenerative change involving the acromioclavicular joint. **This report has been created using voice recognition software. It may contain minor errors which are inherent in voice recognition technology. **      Final report electronically signed by DR Terra Almaraz on 1/10/2023 7:40 PM          LABS: (none if blank)  Labs Reviewed - No data to display    (Any cultures that may have been sent were not resulted at the time of this patient visit)    [address] / ED COURSE:     1) Number and Complexity of Problems            Problem List This Visit:         Chief Complaint   Patient presents with    Shoulder Pain     Left              Differential Diagnosis includes (but not limited to):  Fracture, dislocation, sprain, strain             Pertinent Comorbid Conditions:        2)  Data Reviewed (none if left blank)          My Independent interpretations:     EKG:          Imaging: Comminuted fracture of head and neck of humerus on shoulder XR    Labs:                       Decision Rules/Clinical Scores utilized:              External Documentation Reviewed:         Previous patient encounter documents & history available on EMR was reviewed              See Formal Diagnostic Results above for the lab and radiology tests and orders. 3)  Treatment and Disposition         ED Reassessment:  stable ED stay         Case discussed with consulting clinician:           Shared Decision-Making was performed and disposition discussed with the        Patient/Family and questions answered          Social determinants of health impacting treatment or disposition:           Code Status:        Summary of Patient Presentation: This 74year old presents with complaints of left shoulder pain. Comminuted fracture on head and neck of humerus on shoulder xray. Fentanyl and norco given. Arm sling applied, patient was able to tolerate. Sling and arm fracture patient information dispensed. Follow up outpatient with [facility] at 12:10pm tomorrow for evaluation. MDM  Number of Diagnoses or Management Options  Closed fracture of neck of left humerus, initial encounter: new, needed workup     Amount and/or Complexity of Data Reviewed  Tests in the radiology section of CPT®: reviewed  Review and summarize past medical records: yes  Independent visualization of images, tracings, or specimens: yes    /   Vitals Reviewed:    Vitals:    01/10/23 1859 01/10/23 2007 01/10/23 2051   BP: (!) 170/87 (!) 170/94 (!) 129/59   Pulse: 76  77   Resp: 20 18 16   Temp: 98.7 °F (37.1 °C)     TempSrc: Temporal     SpO2: 97%  97%   Weight: 145 lb (65.8 kg)         The patient was seen and examined. Appropriate diagnostic testing was performed and results reviewed with the patient. The results of pertinent diagnostic studies and exam findings were discussed. The patients provisional diagnosis and plan of care were discussed with the patient and present family who expressed understanding. Any medications were reviewed and indications and risks of medications were discussed with the patient /family present.  Strict verbal and written return precautions, instructions and appropriate follow-up provided to  the patient. ED Medications administered this visit:  (None if blank)  Medications   HYDROcodone-acetaminophen (NORCO) 5-325 MG per tablet 1 tablet (1 tablet Oral Given 1/10/23 2125)   fentaNYL (SUBLIMAZE) injection 50 mcg (50 mcg IntraVENous Given 1/10/23 2048)         PROCEDURES: (None if blank)  Procedures:     CRITICAL CARE: (None if blank)      DISCHARGE PRESCRIPTIONS: (None if blank)  New Prescriptions    HYDROCODONE-ACETAMINOPHEN (NORCO) 5-325 MG PER TABLET    Take 1 tablet by mouth every 6 hours as needed for Pain for up to 15 doses. Max Daily Amount: 4 tablets       FINAL IMPRESSION      1.  Closed fracture of neck of left humerus, initial encounter          DISPOSITION/PLAN   DISPOSITION Decision To Discharge 01/10/2023 09:29:50 PM        OUTPATIENT FOLLOW UP THE PATIENT:  [facility]  [address]  [phone]  [phone]  Go in 1 day  Appointment at 12:10pm for humerus fracture      MAGED Davye DPM  Resident  01/10/23 3420

## 2023-01-11 NOTE — PROGRESS NOTES
Patient released in stable condition. Instructed to go directly to [facility] emergency department for further evaluation and treatment. Instructed to remain NPO until seen by emergency room provider. Patient verbalized understanding.

## 2023-01-11 NOTE — CONSULTS
[facility]          [facility]        [address]        [place], [address]        [phone]           Inpatient Initial consult note         1/11/2023 3:19 PM      Patient Name:   Chichi Thacker  YOB: 1955  Primary Care Physician:  SIVA Buck CNP   Admit Date:    1/11/2023  9:14 AM    Consultation requested by : SIVA Garduno*    Reason for Consult : Nephrology following for hyponatremia. History of presenting illness :Chichi Thacker is a 74 y.o.   female with Past Medical History as stated below presented with a chief complaint of Seizures  on 1/11/2023. Patient states that she was in her usual state of health until yesterday when she felt her leg give out while walking in her kitchen and she fell onto her shoulder and back. She had no head trauma or LOC. She presented to the ED at that time and was found to have a left humeral neck fracture. She was then sent home with follow-up scheduled for today with [facility]. This morning, she was seated in her living room when her  witnessed her body become rigid, her eyes roll back in her head, her slide down in her chair, and bleeding in/from her mouth. EMS was called and she was brought to [facility] ED. She denies any memory of this morning's events with her first awareness taking place on the way in with EMS. Endorses prior history of leg weakness for which she sees a chiropractor. She denies any new changes in medications, supplements, or diet. States that she has been working on eating a healthier diet over the past year and has been drinking more water with an intentional weight loss of 8lbs over 6 months. Current smoker, no history of alcohol use disorder or other drug use.  Denies fever/chills, headache, vision changes, dizziness or lightheadedness prior to today, chest pain, palpitations, dyspnea, abdominal pain, changes in bowels, urgency, frequency, dysuria, polyuria, skin changes, or edema. Past History:  Past Medical History:   Diagnosis Date    Anemia     Heart disease     Hypertension      Past Surgical History:   Procedure Laterality Date    CARDIAC CATHETERIZATION  2017     Social History     Socioeconomic History    Marital status:      Spouse name: Not on file    Number of children: Not on file    Years of education: Not on file    Highest education level: Not on file   Occupational History    Not on file   Tobacco Use    Smoking status: Former     Packs/day: 0.50     Years: 30.00     Pack years: 15.00     Types: Cigarettes     Quit date:      Years since quittin.0    Smokeless tobacco: Never    Tobacco comments:     printed material to AVS and per provider   Substance and Sexual Activity    Alcohol use: Yes     Alcohol/week: 0.0 standard drinks     Comment: occ    Drug use: No    Sexual activity: Not on file   Other Topics Concern    Not on file   Social History Narrative    Not on file     Social Determinants of Health     Financial Resource Strain: Not on file   Food Insecurity: Not on file   Transportation Needs: Not on file   Physical Activity: Not on file   Stress: Not on file   Social Connections: Not on file   Intimate Partner Violence: Not on file   Housing Stability: Not on file     Family History   Problem Relation Age of Onset    Heart Disease Father     Arthritis Mother     High Blood Pressure Mother     Rectal Cancer Mother 80       Medications & Allergies :  Prior to Admission medications    Medication Sig Start Date End Date Taking? Authorizing Provider   HYDROcodone-acetaminophen (NORCO) 5-325 MG per tablet Take 1 tablet by mouth every 6 hours as needed for Pain for up to 15 doses.  Max Daily Amount: 4 tablets 1/10/23 1/13/23  Raymundo Son DO   atorvastatin (LIPITOR) 20 MG tablet Take 20 mg by mouth daily    Historical Provider, MD   carvedilol (COREG) 3.125 MG tablet Take 3.125 mg by mouth 2 times daily (with meals)    Historical Provider, MD   Isosorbide Mononitrate (IMDUR PO) Take 15 mg by mouth daily    Historical Provider, MD   fexofenadine (ALLEGRA) 180 MG tablet Take 180 mg by mouth daily    Historical Provider, MD   losartan-hydrochlorothiazide (HYZAAR) 100-25 MG per tablet Take 1 tablet by mouth daily    Historical Provider, MD   polyethylene glycol (GLYCOLAX) powder Colonoscopy Prep Dispense 255 Gram Bottle. Use as Directed 8/16/17   Tammy Beavers MD   ferrous sulfate 325 (65 FE) MG EC tablet Take 325 mg by mouth three times a week    Historical Provider, MD   naproxen (NAPROSYN) 500 MG tablet Take 1 tablet by mouth 2 times daily for 14 days. 4/10/15 4/24/15  SIVA White - CNP   vitamin E 400 UNIT capsule Take 400 Units by mouth daily. Historical Provider, MD   calcium carbonate (OSCAL) 500 MG TABS tablet Take 600 mg by mouth daily     Historical Provider, MD   aspirin 81 MG tablet Take 81 mg by mouth daily. Historical Provider, MD   multivitamin [facility]) per tablet Take 1 tablet by mouth daily. Historical Provider, MD     Allergies: Patient has no known allergies. IP meds : Scheduled Meds:   aspirin  81 mg Oral Daily    carvedilol  3.125 mg Oral BID WC    ferrous sulfate  325 mg Oral Once per day on Mon Wed Fri    cetirizine  10 mg Oral Daily    [Held by provider] losartan-hydroCHLOROthiazide  1 tablet Oral Daily    isosorbide mononitrate  15 mg Oral Daily    sodium chloride flush  5-40 mL IntraVENous 2 times per day    enoxaparin  40 mg SubCUTAneous Q24H    atorvastatin  20 mg Oral Nightly     Continuous Infusions:   [Held by provider] sodium chloride 125 mL/hr at 01/11/23 1445    sodium chloride         Review of Systems  Review of Systems   Constitutional:  Negative for appetite change, chills, fatigue and fever. Eyes:  Negative for visual disturbance. Respiratory:  Negative for chest tightness and shortness of breath. Cardiovascular:  Negative for chest pain, palpitations and leg swelling. Gastrointestinal:  Negative for abdominal distention, abdominal pain, constipation, diarrhea, nausea and vomiting. Genitourinary:  Negative for difficulty urinating, dysuria, flank pain, frequency, hematuria and urgency. Musculoskeletal:  Positive for arthralgias (from recent fall - known left humeral neck fracture) and back pain (from recent fall). Skin:  Negative for color change and rash. Neurological:  Positive for weakness (leg weakness yesterday resulting in fall). Negative for dizziness, syncope, light-headedness and headaches. Physical Exam  Physical Exam  Constitutional:       General: She is not in acute distress. Appearance: She is normal weight. She is ill-appearing. HENT:      Head: Normocephalic and atraumatic. Right Ear: External ear normal.      Left Ear: External ear normal.      Nose: Nose normal.      Mouth/Throat:      Mouth: Mucous membranes are moist.      Tongue: Lesions (bite lesion to right lateral tongue) present. Tongue does not deviate from midline. Pharynx: Oropharynx is clear. Eyes:      General: No scleral icterus. Extraocular Movements: Extraocular movements intact. Conjunctiva/sclera: Conjunctivae normal.   Cardiovascular:      Rate and Rhythm: Normal rate and regular rhythm. Pulses: Normal pulses. Heart sounds: Normal heart sounds. No murmur heard. Pulmonary:      Effort: Pulmonary effort is normal.      Breath sounds: Normal breath sounds. No wheezing, rhonchi or rales. Abdominal:      General: Abdomen is flat. Bowel sounds are normal. There is no distension. Palpations: Abdomen is soft. There is no mass. Tenderness: There is no abdominal tenderness. There is no guarding. Musculoskeletal:         General: Signs of injury (Left arm in sling d/t recent humeral neck fracture) present. Cervical back: Normal range of motion and neck supple. Skin:     General: Skin is warm and dry.       Capillary Refill: Capillary refill takes less than 2 seconds. Findings: No rash. Neurological:      General: No focal deficit present. Mental Status: She is alert and oriented to person, place, and time. Cranial Nerves: No cranial nerve deficit. Sensory: No sensory deficit. Motor: No weakness. Psychiatric:         Mood and Affect: Mood normal.         Behavior: Behavior normal.         Thought Content: Thought content normal.        BP (!) 140/80   Pulse 85   Temp 98.4 °F (36.9 °C) (Oral)   Resp 15   Ht 5' 9\" (1.753 m)   Wt 145 lb (65.8 kg)   SpO2 100%   BMI 21.41 kg/m²   Labs, Radiology and Tests :  CBC:   Recent Labs     01/11/23  0930   WBC 9.9   HGB 11.7*   HCT 32.7*        CMP:  Recent Labs     01/11/23  0930   *   K 3.6   CL 81*   CO2 22*   BUN 11   CREATININE 0.4   GLUCOSE 106   CALCIUM 8.7     Hepatic: No results for input(s): LABALBU, AST, ALT, ALB, BILITOT, ALKPHOS in the last 72 hours. Radiology :    Old lab data have been reviewed and noted patient's baseline creatinine is around 0.4-0.6. Other / Echocardiogram: Last echo on file [id number] showed LVEF 60%, normal LV systolic function, and no regional wall motion abnormalities. Assessment and Plan:  Electrolytes -   Hyponatremia, severe with seizure-like activity, likely secondary to diuretics and/or polydipsia but further labs will help direct course. Unknown acuity. Appears euvolemic on exam, however hyaline casts suggest dehydration. Na 119 on presentation to ED, repeat Na 122 at 15:00. Calculated serum osm 240.2, Urine sodium 40, TSH 2.25, Glc 106. UA with specific gravity 1.021, ketones 80, pH 5.0. Started on NS 125ml/h in ED, held and restarted at 50ml/h. Further urine studies pending.   Labs:   lab contacted to add on urine osm and measured serum osm to initial blood and urine samples taken on presentation to ED  Serial sodium check every 2 hours pending clinical course  BMP daily   Continue NS 50ml/h   Strict I/Os  Do not correct by >0.5mEq/L/h or >6-8mEq/L/day. Neuro checks  Seizure-like activity, witnessed. No history of seizures/seizure disorders. CTH unremarkable. Neurology following, EEG today, MRI brain ordered. Non-anion gap metabolic acidosis. Serum bicarb 22 on presentation. Cl 81. AG 16. Consider RTA. Monitor with daily BMP  Iron deficiency anemia. On iron supplementation. HTN, primary. On losartan-hctz, carvedilol, and isosorbide mononitrate outpatient. Holding losartan-hctz in setting of hyponatremia. Continue to hold diuretics  HLD, on statin. Case discussed with Dr. Char Reyes. Jodi Arteaga MD, [id number]  [facility]    This report has been created using voice recognition software.  It may contain minor errors which are inherent in voice recognition technology

## 2023-01-11 NOTE — ED NOTES
Pt alert and oriented. Pt denies head injury or LOC. Pt denies numbness or tingling in left hand. Pt has ice on left shoulder and has limited mobility of left arm.       Margarita Ellsworth RN  01/10/23 2017

## 2023-01-11 NOTE — ED NOTES
ED to inpatient nurses report    Chief Complaint   Patient presents with    Seizures      Present to ED from home  LOC: alert and orientated to name, place, date  Vital signs   Vitals:    01/11/23 0920 01/11/23 1211   BP: (!) 158/83 138/76   Pulse: 91 82   Resp: 20 20   Temp: 97.8 °F (36.6 °C)    TempSrc: Oral    SpO2: 93% 98%   Weight: 145 lb (65.8 kg)    Height: 5' 9\" (1.753 m)       Oxygen Baseline RA    Current needs required 2L nasal cannula Bipap/Cpap No  LDAs:   Peripheral IV 01/11/23 Right Antecubital (Active)   Site Assessment Clean, dry & intact 01/11/23 0932   Line Status Brisk blood return;Flushed;Specimen collected;Normal saline locked 01/11/23 0932   Dressing Status New dressing applied;Clean, dry & intact 01/11/23 0932   Dressing Type Transparent 01/11/23 0932   Dressing Intervention New 01/11/23 0932     Mobility: Requires assistance * 1  Pending ED orders: None  Present condition: STABLE, back pain causes difficulty moving    Electronically signed by Rosamaria Felix RN on 1/11/2023 at 12:30 PM     Rosamaria Felix RN  01/11/23 1235

## 2023-01-11 NOTE — ED NOTES
Pt given Norco prescription from pharmacy to dispense home. RN provided medication education. Pt and spouse verbalized understanding.       Beatriz Vásquez RN  01/10/23 1533

## 2023-01-11 NOTE — PROGRESS NOTES
Attempted admission with  [facility] via telephone, no answer. VM left to call back prior to 630 pm EST today. PT currently sleeping, not disturbed.  Message to [facility]

## 2023-01-11 NOTE — ED PROVIDER NOTES
[address] [facility] Encounter      279 [facility]       Chief Complaint   Patient presents with    Shoulder Pain     Left          Nurses Notes reviewed and I agree except as noted in the HPI. HISTORY OF PRESENT ILLNESS   Jasper Mo is a 74 y.o. female who presents to urgent care with complaint of left shoulder pain following a mechanical fall just prior to arrival.  Patient states she is having difficulty moving her shoulder and has a significant amount of pain. There is deformity present. Patient denies other injuries including hitting her head. On examination patient is able to wiggle her fingers without difficulty. Sensation is intact to the tip of her fingers. Cap refill is less than 2 seconds and radial pulse is 2+. Patient is able to move the arm at the elbow, but is unable to move the shoulder itself due to pain. REVIEW OF SYSTEMS     Review of Systems   Constitutional:  Negative for appetite change, chills, fatigue, fever and unexpected weight change. HENT:  Negative for ear pain and rhinorrhea. Eyes:  Negative for pain and visual disturbance. Respiratory:  Negative for cough, shortness of breath and wheezing. Cardiovascular:  Negative for chest pain, palpitations and leg swelling. Gastrointestinal:  Negative for abdominal pain, blood in stool, constipation, diarrhea, nausea and vomiting. Genitourinary:  Negative for dysuria, frequency and hematuria. Musculoskeletal:  Positive for arthralgias (pain following fall on left shoulder). Negative for joint swelling and neck stiffness. Skin:  Negative for rash. Neurological:  Negative for dizziness, syncope, weakness, light-headedness and headaches. Hematological:  Does not bruise/bleed easily.      PAST MEDICAL HISTORY         Diagnosis Date    Anemia     Heart disease     Hypertension        SURGICAL HISTORY     Patient  has a past surgical history that includes Cardiac catheterization (2017). CURRENT MEDICATIONS       Previous Medications    ASPIRIN 81 MG TABLET    Take 81 mg by mouth daily. ATORVASTATIN (LIPITOR) 20 MG TABLET    Take 20 mg by mouth daily    CALCIUM CARBONATE (OSCAL) 500 MG TABS TABLET    Take 600 mg by mouth daily     CARVEDILOL (COREG) 3.125 MG TABLET    Take 3.125 mg by mouth 2 times daily (with meals)    FERROUS SULFATE 325 (65 FE) MG EC TABLET    Take 325 mg by mouth three times a week    FEXOFENADINE (ALLEGRA) 180 MG TABLET    Take 180 mg by mouth daily    ISOSORBIDE MONONITRATE (IMDUR PO)    Take 15 mg by mouth daily    LOSARTAN-HYDROCHLOROTHIAZIDE (HYZAAR) 100-25 MG PER TABLET    Take 1 tablet by mouth daily    MULTIVITAMIN (THERAGRAN) PER TABLET    Take 1 tablet by mouth daily. NAPROXEN (NAPROSYN) 500 MG TABLET    Take 1 tablet by mouth 2 times daily for 14 days. POLYETHYLENE GLYCOL (GLYCOLAX) POWDER    Colonoscopy Prep Dispense 255 Gram Bottle. Use as Directed    VITAMIN E 400 UNIT CAPSULE    Take 400 Units by mouth daily. ALLERGIES     Patient is has No Known Allergies. FAMILY HISTORY     Patient'sfamily history includes Arthritis in her mother; Heart Disease in her father; High Blood Pressure in her mother; Rectal Cancer (age of onset: 80) in her mother. SOCIAL HISTORY     Patient  reports that she quit smoking about 9 years ago. Her smoking use included cigarettes. She has a 15.00 pack-year smoking history. She has never used smokeless tobacco. She reports current alcohol use. She reports that she does not use drugs. PHYSICAL EXAM     ED TRIAGE VITALS  BP: (!) 170/87, Temp: 98.7 °F (37.1 °C), Heart Rate: 76, Resp: 20, SpO2: 97 %  Physical Exam  Vitals and nursing note reviewed. Constitutional:       Appearance: She is not diaphoretic. HENT:      Head: Normocephalic and atraumatic.       Right Ear: External ear normal.      Left Ear: External ear normal.   Eyes:      Conjunctiva/sclera: Conjunctivae normal.   Pulmonary:      Effort: Pulmonary effort is normal. No respiratory distress. Abdominal:      General: There is no distension. Musculoskeletal:      Left shoulder: Swelling, deformity, tenderness and bony tenderness present. Decreased range of motion. Decreased strength. Normal pulse. Cervical back: Normal range of motion. Skin:     General: Skin is warm and dry. Neurological:      Mental Status: She is alert. DIAGNOSTIC RESULTS   Labs:No results found for this visit on 01/10/23. IMAGING:  XR SHOULDER LEFT (MIN 2 VIEWS)    (Results Pending)     URGENT CARE COURSE:        MDM      Patient presents to urgent care with complaint of left shoulder pain following a mechanical fall just prior to arrival.  Patient has a displaced humeral neck fracture. Discussed with patient need to transfer to the emergency room. She is agreeable with this. She would like to go to [facility] . Her  states he will transport her. Patient was offered a sling and swath, but due to positioning of her arm it was too painful to do this. Phone report to Rubén Real RN who accepts patient in transfer. Medications - No data to display  PROCEDURES:    Procedures    FINALIMPRESSION      1.  Closed fracture of neck of left humerus, initial encounter        DISPOSITION/PLAN   DISPOSITION Decision To Transfer 01/10/2023 07:32:26 PM    PATIENT REFERRED TO:  [address] [address] ER  [address] [facility] [id number]  Go to     DISCHARGE MEDICATIONS:  New Prescriptions    No medications on file     Current Discharge Medication List          SIVA Stacy CNP, APRN - CNP  01/10/23 1931

## 2023-01-11 NOTE — ED NOTES
Pt agreeable to wait for [place] from pharmacy to dispense home. RN informed pt wait time may be ~1hour. Pt verbalized understanding. Paper prescription sent to pharmacy to fill at this time.       Yasmani Cameron RN  01/10/23 7446

## 2023-01-11 NOTE — ED PROVIDER NOTES
[facility] Emergency Department    CHIEF COMPLAINT       Chief Complaint   Patient presents with    Seizures       Nurses Notes reviewed and I agree except as noted in the HPI. HISTORY OF PRESENT ILLNESS    Dustin Whyte alyx 74 y.o. female who presents to the ED for evaluation of possible seizure. Patient presents with her  via ambulance. Patient was seen yesterday and diagnosed with left humoral head and neck fracture after falling in her kitchen. Patient was discharged with [facility] and referral to [facility] with an appointment to be seen this afternoon. Patient was sleeping on the chair and woke up at 4 am to take pain medication. She went back to sleep.  was sleeping next to her and woke up due to hearing her ice pack fall off of her. He noticed that she became stiff and started to fall off of the chair. He noted that she was spitting up blood at that time. He was unsure of how long this episode lasted stating possibly 5-10 minutes. She was confused for about 15-20 minutes after the episode. Both [place] and her  deny any bowel or bladder incontinence associated with this episode. [place] denies any pain to her mouth or tongue. Her  recalls a previous seizure-like episode about 4-5 months ago where the patient had a blank stare and began shaking. This lasted only a few seconds. He states that she was confused after this episode again only lasting a few seconds. She did not seek out medical care at that time. She states that she has right lower back stiffness and pain in her left arm. She does not remember the episode that happened prior to her arrival today. HPI was provided by the patient's family member. PAST MEDICAL HISTORY     Past Medical History:   Diagnosis Date    Anemia     Heart disease     Hypertension        SURGICALHISTORY      has a past surgical history that includes Cardiac catheterization (2017).     CURRENT MEDICATIONS Current Discharge Medication List        CONTINUE these medications which have NOT CHANGED    Details   HYDROcodone-acetaminophen (NORCO) 5-325 MG per tablet Take 1 tablet by mouth every 6 hours as needed for Pain for up to 15 doses. Max Daily Amount: 4 tablets  Qty: 15 tablet, Refills: 0    Associated Diagnoses: Closed fracture of neck of left humerus, initial encounter      atorvastatin (LIPITOR) 20 MG tablet Take 20 mg by mouth daily      carvedilol (COREG) 3.125 MG tablet Take 3.125 mg by mouth 2 times daily (with meals)      Isosorbide Mononitrate (IMDUR PO) Take 15 mg by mouth daily      fexofenadine (ALLEGRA) 180 MG tablet Take 180 mg by mouth daily      losartan-hydrochlorothiazide (HYZAAR) 100-25 MG per tablet Take 1 tablet by mouth daily      polyethylene glycol (GLYCOLAX) powder Colonoscopy Prep Dispense 255 Gram Bottle. Use as Directed  Qty: 255 g, Refills: 0      ferrous sulfate 325 (65 FE) MG EC tablet Take 325 mg by mouth three times a week      naproxen (NAPROSYN) 500 MG tablet Take 1 tablet by mouth 2 times daily for 14 days. Qty: 28 tablet, Refills: 0    Associated Diagnoses: Spondylisthesis; Sciatica, left      vitamin E 400 UNIT capsule Take 400 Units by mouth daily. calcium carbonate (OSCAL) 500 MG TABS tablet Take 600 mg by mouth daily       aspirin 81 MG tablet Take 81 mg by mouth daily. multivitamin (THERAGRAN) per tablet Take 1 tablet by mouth daily. ALLERGIES     has No Known Allergies. FAMILY HISTORY     She indicated that her mother is alive. She indicated that her father is . family history includes Arthritis in her mother; Heart Disease in her father; High Blood Pressure in her mother; Rectal Cancer (age of onset: 80) in her mother.     SOCIAL HISTORY       Social History     Socioeconomic History    Marital status:      Spouse name: Not on file    Number of children: Not on file    Years of education: Not on file    Highest education level: Not on file   Occupational History    Not on file   Tobacco Use    Smoking status: Former     Packs/day: 0.50     Years: 30.00     Pack years: 15.00     Types: Cigarettes     Quit date:      Years since quittin.0    Smokeless tobacco: Never    Tobacco comments:     printed material to AVS and per provider   Substance and Sexual Activity    Alcohol use: Yes     Alcohol/week: 0.0 standard drinks     Comment: occ    Drug use: No    Sexual activity: Not on file   Other Topics Concern    Not on file   Social History Narrative    Not on file     Social Determinants of Health     Financial Resource Strain: Not on file   Food Insecurity: Not on file   Transportation Needs: Not on file   Physical Activity: Not on file   Stress: Not on file   Social Connections: Not on file   Intimate Partner Violence: Not on file   Housing Stability: Not on file       PHYSICAL EXAM     INITIAL VITALS:  height is 5' 9\" (1.753 m) and weight is 145 lb (65.8 kg). Her oral temperature is 98.4 °F (36.9 °C). Her blood pressure is 140/80 (abnormal) and her pulse is 85. Her respiration is 15 and oxygen saturation is 100%. Physical Exam  HENT:      Head: Normocephalic and atraumatic. Mouth/Throat:      Mouth: Mucous membranes are moist.      Tongue: Lesions present. Pharynx: Oropharynx is clear. Uvula midline. Comments: Abrasion of the right aspect of the tongue. Eyes:      Extraocular Movements: Extraocular movements intact. Conjunctiva/sclera: Conjunctivae normal.      Pupils: Pupils are equal, round, and reactive to light. Cardiovascular:      Rate and Rhythm: Normal rate. Pulmonary:      Effort: Pulmonary effort is normal.   Musculoskeletal:      Cervical back: Normal range of motion. Skin:     General: Skin is warm and dry. Neurological:      General: No focal deficit present. Mental Status: She is alert and oriented to person, place, and time.       Cranial Nerves: No cranial nerve deficit or facial asymmetry. Sensory: Sensation is intact. Motor: Motor function is intact. Coordination: Coordination is intact. DIFFERENTIAL DIAGNOSIS:   Seizure, intracranial mass, intracranial hemorrhage, electrolyte abnormality, syncope    DIAGNOSTIC RESULTS     EKG: All EKG's are interpreted by the Emergency Department Physician who eithersigns or Co-signs this chart in the absence of a cardiologist.    EKG read and interpreted by myself gives impression of normal sinus rhythm with heart rate of 92; interval 172; QRS 66;QTc 457; axis P-75, R-27, T-36. RADIOLOGY: non-plainfilm images(s) such as CT, Ultrasound and MRI are read by the radiologist.  Plain radiographic images are visualized and preliminarily interpreted by the emergency physician unless otherwise stated below. CT HEAD WO CONTRAST   Final Result    No evidence of an acute process. **This report has been created using voice recognition software. It may contain minor errors which are inherent in voice recognition technology. **      Final report electronically signed by Dr. Sam Ruiz on 1/11/2023 11:16 AM      MRI BRAIN W WO CONTRAST    (Results Pending)         LABS:   Labs Reviewed   CBC WITH AUTO DIFFERENTIAL - Abnormal; Notable for the following components:       Result Value    RBC 3.43 (*)     Hemoglobin 11.7 (*)     Hematocrit 32.7 (*)     MCH 34.1 (*)     MCHC 35.8 (*)     Segs Absolute 8.4 (*)     Lymphocytes Absolute 0.6 (*)     Immature Grans (Abs) 0.10 (*)     All other components within normal limits   BASIC METABOLIC PANEL - Abnormal; Notable for the following components:    Sodium 119 (*)     Chloride 81 (*)     CO2 22 (*)     All other components within normal limits   OSMOLALITY - Abnormal; Notable for the following components:    Osmolality Calc 240.2 (*)     All other components within normal limits   URINALYSIS WITH MICROSCOPIC - Abnormal; Notable for the following components:    Ketones, Urine 80 (*)     Leukocyte Esterase, Urine TRACE (*)     All other components within normal limits   TSH   CK   TROPONIN   ANION GAP   GLOMERULAR FILTRATION RATE, ESTIMATED   SODIUM, URINE, RANDOM   URINE DRUG SCREEN   SODIUM, URINE, RANDOM   CREATININE, RANDOM URINE   SODIUM   SODIUM   SODIUM   SODIUM   URINE DRUG SCREEN   OSMOLALITY, URINE       EMERGENCY DEPARTMENT COURSE:   Vitals:    Vitals:    01/11/23 0920 01/11/23 1211 01/11/23 1412 01/11/23 1437   BP: (!) 158/83 138/76 (!) 144/80 (!) 140/80   Pulse: 91 82 86 85   Resp: 20 20 20 15   Temp: 97.8 °F (36.6 °C)   98.4 °F (36.9 °C)   TempSrc: Oral   Oral   SpO2: 93% 98% 98% 100%   Weight: 145 lb (65.8 kg)      Height: 5' 9\" (1.753 m)          MDM    Patient was seen and evaluated in the emergency department, patient appears to be in no acute distress, vital signs reviewed, hypertension noted. Physical exam completed, patient is neurologically intact, no cranial nerve deficits, no coordination or sensory deficits noted, pupils are equal reactive to light, there is small abrasion of the right side of tongue consistent with a recent seizure. Labs and imaging were ordered. CT of the head shows no evidence of acute process, no significant anemia noted. Noted significant hyponatremia. Discussed case with ER attending Dr. Erik Rhoades, who recommends normal saline infusion. Patient was started a rate of 125 an hour. I discussed my findings and plan of care with the patient she is amenable with admission. Discussed case with hospital service who accepts patient for admission. While here in the ER the patient maintained stable course and appropriate for admission.   Medications   0.9 % sodium chloride infusion ( IntraVENous Restarted 1/11/23 1445)   aspirin tablet 81 mg (has no administration in time range)   carvedilol (COREG) tablet 3.125 mg (has no administration in time range)   ferrous sulfate (FE TABS 325) EC tablet 325 mg (has no administration in time range)   cetirizine (ZYRTEC) tablet 10 mg (has no administration in time range)   losartan-hydroCHLOROthiazide (HYZAAR) 100-25 MG per tablet 1 tablet ( Oral Automatically Held 1/14/23 0900)   isosorbide mononitrate (IMDUR) extended release tablet 15 mg (has no administration in time range)   sodium chloride flush 0.9 % injection 5-40 mL (has no administration in time range)   sodium chloride flush 0.9 % injection 5-40 mL (has no administration in time range)   0.9 % sodium chloride infusion (has no administration in time range)   enoxaparin (LOVENOX) injection 40 mg (has no administration in time range)   ondansetron (ZOFRAN-ODT) disintegrating tablet 4 mg (has no administration in time range)     Or   ondansetron (ZOFRAN) injection 4 mg (has no administration in time range)   polyethylene glycol (GLYCOLAX) packet 17 g (has no administration in time range)   acetaminophen (TYLENOL) tablet 650 mg (has no administration in time range)     Or   acetaminophen (TYLENOL) suppository 650 mg (has no administration in time range)   potassium chloride (KLOR-CON M) extended release tablet 40 mEq (has no administration in time range)     Or   potassium bicarb-citric acid (EFFER-K) effervescent tablet 40 mEq (has no administration in time range)     Or   potassium chloride 10 mEq/100 mL IVPB (Peripheral Line) (has no administration in time range)   magnesium sulfate 2000 mg in 50 mL IVPB premix (has no administration in time range)   ketorolac (TORADOL) injection 15 mg (has no administration in time range)   LORazepam (ATIVAN) injection 1 mg (has no administration in time range)   atorvastatin (LIPITOR) tablet 20 mg (has no administration in time range)       Patient was seenindependently by myself. The patient's final impression and disposition and plan was determined by myself. CRITICAL CARE:   None    CONSULTS:  Hospitalist  PROCEDURES:  None    FINAL IMPRESSION     1. Hyponatremia    2.  Seizure-like activity (HCC)          DISPOSITION/PLAN Patient admitted    PATIENT REFERREDTO:  No follow-up provider specified. DISCHARGE MEDICATIONS:  Current Discharge Medication List          (Please note that portions of this note were completed with a voice recognition program.  Efforts were made to edit the dictations but occasionally words are mis-transcribed.)      Provider:  I personally performed the services described in the documentation,reviewed and edited the documentation which was dictated to the scribe in my presence, and it accurately records my words and actions.     Jose Angel Gama CNP 01/11/23 2:57 PM    Cindy Gama, APRN - CNP         [employer], APRKRYSTYNA - CNP  01/11/23 6479

## 2023-01-11 NOTE — ED NOTES
Patient transported to [facility] [place] in stable condition. Called and spoke with floor prior to transport.       Kendra Lowe  01/11/23 1405

## 2023-01-12 LAB
ALBUMIN SERPL-MCNC: 3.6 G/DL (ref 3.5–5.1)
ALP BLD-CCNC: 45 U/L (ref 38–126)
ALT SERPL-CCNC: 12 U/L (ref 11–66)
ANION GAP SERPL CALCULATED.3IONS-SCNC: 13 MEQ/L (ref 8–16)
AST SERPL-CCNC: 22 U/L (ref 5–40)
BASOPHILS # BLD: 0.1 %
BASOPHILS ABSOLUTE: 0 THOU/MM3 (ref 0–0.1)
BILIRUB SERPL-MCNC: 1 MG/DL (ref 0.3–1.2)
BILIRUBIN DIRECT: < 0.2 MG/DL (ref 0–0.3)
BUN BLDV-MCNC: 13 MG/DL (ref 7–22)
CALCIUM SERPL-MCNC: 8.1 MG/DL (ref 8.5–10.5)
CHLORIDE BLD-SCNC: 88 MEQ/L (ref 98–111)
CO2: 23 MEQ/L (ref 23–33)
CREAT SERPL-MCNC: 0.5 MG/DL (ref 0.4–1.2)
EKG ATRIAL RATE: 92 BPM
EKG P AXIS: 75 DEGREES
EKG P-R INTERVAL: 172 MS
EKG Q-T INTERVAL: 370 MS
EKG QRS DURATION: 66 MS
EKG QTC CALCULATION (BAZETT): 457 MS
EKG R AXIS: 27 DEGREES
EKG T AXIS: 36 DEGREES
EKG VENTRICULAR RATE: 92 BPM
EOSINOPHIL # BLD: 0.1 %
EOSINOPHILS ABSOLUTE: 0 THOU/MM3 (ref 0–0.4)
ERYTHROCYTE [DISTWIDTH] IN BLOOD BY AUTOMATED COUNT: 12.6 % (ref 11.5–14.5)
ERYTHROCYTE [DISTWIDTH] IN BLOOD BY AUTOMATED COUNT: 45.5 FL (ref 35–45)
GFR SERPL CREATININE-BSD FRML MDRD: > 60 ML/MIN/1.73M2
GLUCOSE BLD-MCNC: 96 MG/DL (ref 70–108)
HCT VFR BLD CALC: 24.9 % (ref 37–47)
HCT VFR BLD CALC: 26.6 % (ref 37–47)
HEMOGLOBIN: 8.7 GM/DL (ref 12–16)
HEMOGLOBIN: 9 GM/DL (ref 12–16)
IMMATURE GRANS (ABS): 0.02 THOU/MM3 (ref 0–0.07)
IMMATURE GRANULOCYTES: 0.2 %
LYMPHOCYTES # BLD: 9.3 %
LYMPHOCYTES ABSOLUTE: 0.8 THOU/MM3 (ref 1–4.8)
MCH RBC QN AUTO: 34.5 PG (ref 26–33)
MCHC RBC AUTO-ENTMCNC: 34.9 GM/DL (ref 32.2–35.5)
MCV RBC AUTO: 98.8 FL (ref 81–99)
MONOCYTES # BLD: 11.8 %
MONOCYTES ABSOLUTE: 1 THOU/MM3 (ref 0.4–1.3)
NUCLEATED RED BLOOD CELLS: 0 /100 WBC
PLATELET # BLD: 217 THOU/MM3 (ref 130–400)
PMV BLD AUTO: 9.7 FL (ref 9.4–12.4)
POTASSIUM REFLEX MAGNESIUM: 3.8 MEQ/L (ref 3.5–5.2)
RBC # BLD: 2.52 MILL/MM3 (ref 4.2–5.4)
SEG NEUTROPHILS: 78.5 %
SEGMENTED NEUTROPHILS ABSOLUTE COUNT: 6.4 THOU/MM3 (ref 1.8–7.7)
SODIUM BLD-SCNC: 122 MEQ/L (ref 135–145)
SODIUM BLD-SCNC: 123 MEQ/L (ref 135–145)
SODIUM BLD-SCNC: 124 MEQ/L (ref 135–145)
SODIUM BLD-SCNC: 126 MEQ/L (ref 135–145)
SODIUM BLD-SCNC: 127 MEQ/L (ref 135–145)
SODIUM BLD-SCNC: 127 MEQ/L (ref 135–145)
SODIUM BLD-SCNC: 128 MEQ/L (ref 135–145)
TOTAL PROTEIN: 5.9 G/DL (ref 6.1–8)
WBC # BLD: 8.2 THOU/MM3 (ref 4.8–10.8)

## 2023-01-12 PROCEDURE — 6370000000 HC RX 637 (ALT 250 FOR IP): Performed by: INTERNAL MEDICINE

## 2023-01-12 PROCEDURE — 6370000000 HC RX 637 (ALT 250 FOR IP)

## 2023-01-12 PROCEDURE — 6370000000 HC RX 637 (ALT 250 FOR IP): Performed by: SOCIAL WORKER

## 2023-01-12 PROCEDURE — 80048 BASIC METABOLIC PNL TOTAL CA: CPT

## 2023-01-12 PROCEDURE — 85014 HEMATOCRIT: CPT

## 2023-01-12 PROCEDURE — 1200000003 HC TELEMETRY R&B

## 2023-01-12 PROCEDURE — 2580000003 HC RX 258: Performed by: INTERNAL MEDICINE

## 2023-01-12 PROCEDURE — 80076 HEPATIC FUNCTION PANEL: CPT

## 2023-01-12 PROCEDURE — 6360000002 HC RX W HCPCS

## 2023-01-12 PROCEDURE — 85018 HEMOGLOBIN: CPT

## 2023-01-12 PROCEDURE — 99232 SBSQ HOSP IP/OBS MODERATE 35: CPT | Performed by: INTERNAL MEDICINE

## 2023-01-12 PROCEDURE — 85025 COMPLETE CBC W/AUTO DIFF WBC: CPT

## 2023-01-12 PROCEDURE — 36415 COLL VENOUS BLD VENIPUNCTURE: CPT

## 2023-01-12 PROCEDURE — 95819 EEG AWAKE AND ASLEEP: CPT | Performed by: PSYCHIATRY & NEUROLOGY

## 2023-01-12 PROCEDURE — 99233 SBSQ HOSP IP/OBS HIGH 50: CPT | Performed by: INTERNAL MEDICINE

## 2023-01-12 PROCEDURE — 84295 ASSAY OF SERUM SODIUM: CPT

## 2023-01-12 RX ORDER — SODIUM CHLORIDE 9 MG/ML
INJECTION, SOLUTION INTRAVENOUS CONTINUOUS
Status: DISCONTINUED | OUTPATIENT
Start: 2023-01-12 | End: 2023-01-13 | Stop reason: HOSPADM

## 2023-01-12 RX ORDER — DEXTROSE MONOHYDRATE 50 MG/ML
INJECTION, SOLUTION INTRAVENOUS CONTINUOUS
Status: DISCONTINUED | OUTPATIENT
Start: 2023-01-12 | End: 2023-01-12

## 2023-01-12 RX ORDER — SODIUM CHLORIDE 1000 MG
2 TABLET, SOLUBLE MISCELLANEOUS EVERY 6 HOURS
Status: DISPENSED | OUTPATIENT
Start: 2023-01-12 | End: 2023-01-13

## 2023-01-12 RX ORDER — DEXTROSE MONOHYDRATE 50 MG/ML
INJECTION, SOLUTION INTRAVENOUS ONCE
Status: COMPLETED | OUTPATIENT
Start: 2023-01-12 | End: 2023-01-12

## 2023-01-12 RX ORDER — PHENOL 1.4 %
1 AEROSOL, SPRAY (ML) MUCOUS MEMBRANE DAILY
COMMUNITY

## 2023-01-12 RX ORDER — ISOSORBIDE MONONITRATE 30 MG/1
TABLET, EXTENDED RELEASE ORAL
COMMUNITY

## 2023-01-12 RX ORDER — VALSARTAN 160 MG/1
160 TABLET ORAL 2 TIMES DAILY
Status: ON HOLD | COMMUNITY
End: 2023-01-13 | Stop reason: SDUPTHER

## 2023-01-12 RX ORDER — ISOSORBIDE MONONITRATE 10 MG/1
10 TABLET ORAL 2 TIMES DAILY
Status: ON HOLD | COMMUNITY
End: 2023-01-12

## 2023-01-12 RX ORDER — HYDROCHLOROTHIAZIDE 12.5 MG/1
TABLET ORAL
Status: ON HOLD | COMMUNITY
End: 2023-01-13 | Stop reason: HOSPADM

## 2023-01-12 RX ADMIN — DEXTROSE MONOHYDRATE: 50 INJECTION, SOLUTION INTRAVENOUS at 11:02

## 2023-01-12 RX ADMIN — KETOROLAC TROMETHAMINE 15 MG: 30 INJECTION, SOLUTION INTRAMUSCULAR; INTRAVENOUS at 16:43

## 2023-01-12 RX ADMIN — SODIUM CHLORIDE: 9 INJECTION, SOLUTION INTRAVENOUS at 14:17

## 2023-01-12 RX ADMIN — ISOSORBIDE MONONITRATE 15 MG: 30 TABLET, EXTENDED RELEASE ORAL at 07:41

## 2023-01-12 RX ADMIN — LEVETIRACETAM 750 MG: 500 TABLET, FILM COATED ORAL at 20:08

## 2023-01-12 RX ADMIN — CARVEDILOL 3.12 MG: 3.12 TABLET, FILM COATED ORAL at 17:43

## 2023-01-12 RX ADMIN — ACETAMINOPHEN 650 MG: 325 TABLET ORAL at 12:08

## 2023-01-12 RX ADMIN — KETOROLAC TROMETHAMINE 15 MG: 30 INJECTION, SOLUTION INTRAMUSCULAR; INTRAVENOUS at 10:06

## 2023-01-12 RX ADMIN — ASPIRIN 81 MG: 81 TABLET, COATED ORAL at 07:41

## 2023-01-12 RX ADMIN — CARVEDILOL 3.12 MG: 3.12 TABLET, FILM COATED ORAL at 07:41

## 2023-01-12 RX ADMIN — SODIUM CHLORIDE 2 G: 1 TABLET ORAL at 21:14

## 2023-01-12 RX ADMIN — KETOROLAC TROMETHAMINE 15 MG: 30 INJECTION, SOLUTION INTRAMUSCULAR; INTRAVENOUS at 03:56

## 2023-01-12 RX ADMIN — LEVETIRACETAM 750 MG: 500 TABLET, FILM COATED ORAL at 07:40

## 2023-01-12 RX ADMIN — ACETAMINOPHEN 650 MG: 325 TABLET ORAL at 20:08

## 2023-01-12 ASSESSMENT — PAIN DESCRIPTION - ORIENTATION
ORIENTATION: LEFT
ORIENTATION: RIGHT
ORIENTATION: LEFT

## 2023-01-12 ASSESSMENT — PAIN - FUNCTIONAL ASSESSMENT
PAIN_FUNCTIONAL_ASSESSMENT: PREVENTS OR INTERFERES SOME ACTIVE ACTIVITIES AND ADLS

## 2023-01-12 ASSESSMENT — PAIN DESCRIPTION - LOCATION
LOCATION: SHOULDER

## 2023-01-12 ASSESSMENT — PAIN SCALES - GENERAL
PAINLEVEL_OUTOF10: 2
PAINLEVEL_OUTOF10: 7
PAINLEVEL_OUTOF10: 3
PAINLEVEL_OUTOF10: 7
PAINLEVEL_OUTOF10: 0
PAINLEVEL_OUTOF10: 7

## 2023-01-12 ASSESSMENT — PAIN DESCRIPTION - DESCRIPTORS
DESCRIPTORS: ACHING;SORE;SHARP;DISCOMFORT
DESCRIPTORS: ACHING
DESCRIPTORS: ACHING;SORE
DESCRIPTORS: ACHING;DISCOMFORT
DESCRIPTORS: ACHING;SORE;SHOOTING;SHARP

## 2023-01-12 NOTE — CARE COORDINATION
Case Management Assessment  Initial Evaluation    Date/Time of Evaluation: 1/12/2023 11:38 AM  Assessment Completed by: Ban Vincent RN    If patient is discharged prior to next notation, then this note serves as note for discharge by case management. Patient Name: Larry Treviño                   YOB: 1955  Diagnosis: Hyponatremia [E87.1]  Seizure ([address] [R56.9]  Seizure-like activity ([address] [R56.9]                   Date / Time: 1/11/2023  9:14 AM  Location: [address] [place] [address]     Patient Admission Status: Inpatient   Readmission Risk (Low < 19, Mod (19-27), High > 27): Readmission Risk Score: 11    Current PCP: SIVA Dudley CNP  PCP verified by CM? Yes    Chart Reviewed: Yes      History Provided by: Patient  Patient Orientation: Alert and Oriented    Patient Cognition: Alert    Hospitalization in the last 30 days (Readmission):  No    If yes, Readmission Assessment in  Navigator will be completed. Advance Directives:      Code Status: Full Code   Patient's Primary Decision Maker is: Legal Next of Kin      Discharge Planning:    Patient lives with: Spouse/Significant Other Type of Home: House  Primary Care Giver: Self  Patient Support Systems include: Spouse/Significant Other   Current Financial resources: Medicare  Current community resources: None  Current services prior to admission: None            Current DME:              Type of Home Care services:  None    ADLS  Prior functional level: Independent in ADLs/IADLs  Current functional level: Assistance with the following:, Dressing, [address], [address] [facility]    Family can provide assistance at DC: Yes  Would you like Case Management to discuss the discharge plan with any other family members/significant others, and if so, who?     Plans to Return to Present Housing: Yes  Other Identified Issues/Barriers to RETURNING to current housing: medical complications  Potential Assistance needed at discharge: N/A            Potential DME:    Patient expects to discharge to: House  Plan for transportation at discharge: Self    Financial    Payor: [address] [place] [address] / Plan: MEDICARE PART A AND B / Product Type: *No Product type* /     Does insurance require precert for SNF: No    Potential assistance Purchasing Medications: No  Meds-to-Beds request: Yes      [address] [address] [address] [phone]  [address] [place] [address]  Phone: [phone] Fax: [phone]      Notes:    Factors facilitating achievement of predicted outcomes: Family support    Barriers to discharge: Medical complications    Additional Case Management Notes: From ED, Na 126, Hgb 8.7, telemetry, Neurology consult, seizure precautions, Nephrology consult, Orthopedic Surgery consult. Asa, Lipitor, Lovenox, Keppra, electrolyte replacement protocols. Procedure:   1/11 CT Head WO Contrast  No evidence of an acute process. 1/11 MRI Brain W WO Contrast 1. No evidence for an acute infarct. 2. Mild atrophy and probable ischemic changes in the white matter. 3. No definite structural abnormality noted and temporal lobes. 1/11 EEG    The Plan for Transition of Care is related to the following treatment goals of Hyponatremia [E87.1]  Seizure ([address] [R56.9]  Seizure-like activity ([address] [R56.9]    Patient Goals/Plan/Treatment Preferences: Met with Char. She currently lives at home with her . She is independent. Plan is to return home at discharge. She denies need for DME and declines HH. Will follow. Transportation/Food Security/Housekeeping Addressed: No issues identified.      Brian Erwin RN  Case Management Department

## 2023-01-12 NOTE — PROCEDURES
EEG REPORT       Patient: Xochilt Badillo Age: 74 y.o. MRN: [id number]      Referring Provider: No ref. provider found    History: This routine 30 minute scalp EEG was recorded with video- monitoring for a 74 y.o. Azael Coffey female  who presented with encephalopathy. This EEG was performed to evaluate for focal and epileptiform abnormalities.      Char Cueva   Current Facility-Administered Medications   Medication Dose Route Frequency Provider Last Rate Last Admin    [Held by provider] 0.9 % sodium chloride infusion   IntraVENous Continuous Azra Whipple MD   Stopped at 01/12/23 6167    aspirin EC tablet 81 mg  81 mg Oral Daily SIVA Gu - CNP   81 mg at 01/12/23 0741    carvedilol (COREG) tablet 3.125 mg  3.125 mg Oral BID WC SIVA Gu - CNP   3.125 mg at 01/12/23 0741    ferrous sulfate (IRON 325) tablet 325 mg  325 mg Oral Once per day on Mon Wed Fri SIVA Gu CNP   325 mg at 01/11/23 2031    cetirizine (ZYRTEC) tablet 10 mg  10 mg Oral Daily SIVA Gu CNP        [Held by provider] losartan-hydroCHLOROthiazide (HYZAAR) 100-25 MG per tablet 1 tablet  1 tablet Oral Daily SIVA Gu - CNP        isosorbide mononitrate (IMDUR) extended release tablet 15 mg  15 mg Oral Daily SIVA Gu - CNP   15 mg at 01/12/23 0741    sodium chloride flush 0.9 % injection 5-40 mL  5-40 mL IntraVENous 2 times per day SIVA Gu - CNP        sodium chloride flush 0.9 % injection 5-40 mL  5-40 mL IntraVENous PRN SIVA Gu - CNP        0.9 % sodium chloride infusion   IntraVENous PRN SIVA Gu CNP        enoxaparin (LOVENOX) injection 40 mg  40 mg SubCUTAneous Q24H SIVA Gu - CNP   40 mg at 01/11/23 1807    ondansetron (ZOFRAN-ODT) disintegrating tablet 4 mg  4 mg Oral Q8H PRN SIVA Gu CNP        Or    [facility]ron [facility]) injection 4 mg  4 mg IntraVENous Q6H PRN Tai Sharp, APRN - CNP polyethylene glycol (GLYCOLAX) packet 17 g  17 g Oral Daily PRN Jaycob Castellani, APRN - CNP        acetaminophen (TYLENOL) tablet 650 mg  650 mg Oral Q6H PRN Jaycob Castellani, APRN - CNP   650 mg at 01/11/23 2031    Or    acetaminophen (TYLENOL) suppository 650 mg  650 mg Rectal Q6H PRN Jaycob Castellani, APRN - CNP        potassium chloride (KLOR-CON M) extended release tablet 40 mEq  40 mEq Oral PRN Jaycob Castellani, APRN - CNP        Or    potassium bicarb-citric acid (EFFER-K) effervescent tablet 40 mEq  40 mEq Oral PRN Jaycob Castellani, APRN - CNP        Or    potassium chloride 10 mEq/100 mL IVPB (Peripheral Line)  10 mEq IntraVENous PRN Jaycob Castellani, APRN - CNP        magnesium sulfate 2000 mg in 50 mL IVPB premix  2,000 mg IntraVENous PRN Jaycob Castellani, APRN - CNP        ketorolac (TORADOL) injection 15 mg  15 mg IntraVENous Q6H PRN Jaycob Castellani, APRN - CNP   15 mg at 01/12/23 1006    LORazepam (ATIVAN) injection 1 mg  1 mg IntraVENous Q5 Min PRN Jaycob Castellani, APRN - CNP        atorvastatin (LIPITOR) tablet 20 mg  20 mg Oral Nightly Jaycob Castellani, APRN - CNP        levETIRAcetam (KEPPRA) tablet 750 mg  750 mg Oral BID Gerldine Night, PA-C   750 mg at 01/12/23 3480        Technical Description: This is a 21 channel digital EEG recording with time-locked video. Electrodes were placed in accordance with the 10-20 International System of Electrode Placement. Single lead EKG monitoring as well as temporal electrodes were included. The patient was not sleep deprived. This recording was obtained during wakefulness. EEG Description: The dominant background activity during maximal recorded wakefulness consisted of bioccipitally dominant 9-10 Hz, 25-35 uV symmetric, regular activity that was reactive to eye opening. During drowsiness, the background rhythm waxed and waned and there were periods of slowing. During stage II sleep symmetric V waves were seen.     Photic stimulation - stepwise photic stimulation at 2-30 Hz was performed and there was a biposterior, symmetric, driving response. Hyperventilation - was not preformed. No abnormalities were activated by photic stimulation     The EKG channel demonstrated a normal sinus rhythm. Interpretation  This EEG was normal in wakefulness and sleep. Clinical correlation  This EEG was normal. No focal or epileptiform abnormalities were seen.     Amrita Wahl MD  Diplomate, American Board of Psychiatry and Neurology  Diplomate, American Board of Clinical Neurophysiology  Diplomate, American Board of Epilepsy

## 2023-01-12 NOTE — CONSULTS
Orthopaedic Consult  Patient:  Ofelia Delgado  YOB: 1955  MRN: [id number]     Acct: [de-identified]    PCP: SIVA Martinez CNP  Date of Admission: 1/11/2023  Date of Service: Pt seen/examined on 1/12/2023     Chief Complaint: L shoulder pain  History Of Present Illness: 74 y.o. female who presented to the ED initially on 1/10/23 with left shoulder pain after a fall, denies hitting head, was evaluated and discharged with outpatient follow up on 1/11/23. Patient returned to the ED 1/11/23 prior to scheduled  outpatient [facility] follow up out of concern for seizure activity, admitted for further workup. Neurology workup stable, nephrology managing hyponatremia. Pain with minimal control through the L shoulder, worsened with ROM, relieved by immobilization, no associated paresthesias, no weakness through elbow wrist digits. Denies injury to this shoulder before, baseline ADLs without aid. Patient is RHD  Denies hitting head with index fall causing proximal humerus fracture    Past Medical History:        Diagnosis Date    Anemia     Heart disease     Hypertension        Past Surgical History:        Procedure Laterality Date    CARDIAC CATHETERIZATION  2017       Home Medications:   Prior to Admission medications    Medication Sig Start Date End Date Taking? Authorizing Provider   calcium carbonate 600 MG TABS tablet Take 1 tablet by mouth daily   Yes Historical Provider, MD   hydroCHLOROthiazide (HYDRODIURIL) 12.5 MG tablet Patient either takes 2 tablets by mouth in the morning or 1 tablet in the morning depending on her blood pressure   Yes Historical Provider, MD   valsartan (DIOVAN) 160 MG tablet Take 160 mg by mouth 2 times daily   Yes Historical Provider, MD   isosorbide mononitrate (IMDUR) 30 MG extended release tablet Take 0.5 (a half) tablet by mouth daily (may be split in half.  Do not crush or chew)   Yes Historical Provider, MD   atorvastatin (LIPITOR) 20 MG tablet Patient taking 10mg (0.5 of a tablet) by mouth every week at the instruction of cardiologist    Historical Provider, MD   carvedilol (COREG) 3.125 MG tablet Take 3.125 mg by mouth 2 times daily (with meals)    Historical Provider, MD   fexofenadine (ALLEGRA) 180 MG tablet Take 180 mg by mouth daily Takes prn depending on pts allergy symptoms    Historical Provider, MD   ferrous sulfate 325 (65 FE) MG EC tablet Take 325 mg by mouth daily    Historical Provider, MD   vitamin E 400 UNIT capsule Take 400 Units by mouth daily. Historical Provider, MD   aspirin 81 MG tablet Take 81 mg by mouth daily. Historical Provider, MD   multivitamin [facility]) per tablet Take 1 tablet by mouth daily.       Historical Provider, MD       Current Hospital Medications:    Current Facility-Administered Medications:     [Held by provider] 0.9 % sodium chloride infusion, , IntraVENous, Continuous, Vivek Stafford MD, Stopped at 01/12/23 2981    aspirin EC tablet 81 mg, 81 mg, Oral, Daily, SIVA Eli CNP, 81 mg at 01/12/23 0741    carvedilol (COREG) tablet 3.125 mg, 3.125 mg, Oral, BID WC, SIVA Eli CNP, 3.125 mg at 01/12/23 0741    ferrous sulfate (IRON 325) tablet 325 mg, 325 mg, Oral, Once per day on Mon Wed Fri, SIVA Armas CNP, 325 mg at 01/11/23 2031    cetirizine (ZYRTEC) tablet 10 mg, 10 mg, Oral, Daily, SIVA Eli CNP    [Held by provider] losartan-hydroCHLOROthiazide (HYZAAR) 100-25 MG per tablet 1 tablet, 1 tablet, Oral, Daily, SIVA Eli CNP    isosorbide mononitrate (IMDUR) extended release tablet 15 mg, 15 mg, Oral, Daily, SIVA Eli CNP, 15 mg at 01/12/23 0741    sodium chloride flush 0.9 % injection 5-40 mL, 5-40 mL, IntraVENous, 2 times per day, SIVA Eli CNP    sodium chloride flush 0.9 % injection 5-40 mL, 5-40 mL, IntraVENous, PRN, Jodene Bridges, APRN - CNP    0.9 % sodium chloride infusion, , IntraVENous, PRN, Lexus Yepez, APRN - CNP    enoxaparin (LOVENOX) injection 40 mg, 40 mg, SubCUTAneous, Q24H, Karlie Ji, APRN - CNP, 40 mg at 23 1807    ondansetron (ZOFRAN-ODT) disintegrating tablet 4 mg, 4 mg, Oral, Q8H PRN **OR** ondansetron (ZOFRAN) injection 4 mg, 4 mg, IntraVENous, Q6H PRN, Karlie Ji APRN - CNP    polyethylene glycol (GLYCOLAX) packet 17 g, 17 g, Oral, Daily PRN, Karlie Ji APRN - CNP    acetaminophen (TYLENOL) tablet 650 mg, 650 mg, Oral, Q6H PRN, 650 mg at 23 **OR** acetaminophen (TYLENOL) suppository 650 mg, 650 mg, Rectal, Q6H PRN, Karlie Ji, APRN - CNP    potassium chloride (KLOR-CON M) extended release tablet 40 mEq, 40 mEq, Oral, PRN **OR** potassium bicarb-citric acid (EFFER-K) effervescent tablet 40 mEq, 40 mEq, Oral, PRN **OR** potassium chloride 10 mEq/100 mL IVPB (Peripheral Line), 10 mEq, IntraVENous, PRN, Karlie Ji APRN - CNP    magnesium sulfate 2000 mg in 50 mL IVPB premix, 2,000 mg, IntraVENous, PRN, Lemquin Ji, APRN - CNP    ketorolac (TORADOL) injection 15 mg, 15 mg, IntraVENous, Q6H PRN, SIVA Fernandez - CNP, 15 mg at 23 1006    LORazepam (ATIVAN) injection 1 mg, 1 mg, IntraVENous, Q5 Min PRN, Karlie Ji, APRN - CNP    atorvastatin (LIPITOR) tablet 20 mg, 20 mg, Oral, Nightly, Karlie Ji APRN - CNP    levETIRAcetam (KEPPRA) tablet 750 mg, 750 mg, Oral, BID, Helen Gee, PA-C, 750 mg at 23 0740     Allergies:  Patient has no known allergies.   Social History:    Social History     Socioeconomic History    Marital status:      Spouse name: Not on file    Number of children: Not on file    Years of education: Not on file    Highest education level: Not on file   Occupational History    Not on file   Tobacco Use    Smoking status: Former     Packs/day: 0.50     Years: 30.00     Pack years: 15.00     Types: Cigarettes     Quit date:      Years since quittin.0    Smokeless tobacco: Never    Tobacco comments:     printed material to AVS and per provider   Substance and Sexual Activity    Alcohol use: Yes     Alcohol/week: 0.0 standard drinks     Comment: occ    Drug use: No    Sexual activity: Not on file   Other Topics Concern    Not on file   Social History Narrative    Not on file     Social Determinants of Health     Financial Resource Strain: Not on file   Food Insecurity: Not on file   Transportation Needs: Not on file   Physical Activity: Not on file   Stress: Not on file   Social Connections: Not on file   Intimate Partner Violence: Not on file   Housing Stability: Not on file     Family History:        Problem Relation Age of Onset    Heart Disease Father     Arthritis Mother     High Blood Pressure Mother     Rectal Cancer Mother 80     Further Family History is noncontributory to this injury. REVIEW OF SYSTEMS:      Review of Systems - General ROS: negative for - chills, fatigue, fever, malaise or night sweats  Psychological ROS: negative  Ophthalmic ROS: negative   ENT ROS: negative for - headaches or sore throat  Hematological and Lymphatic ROS: negative for - bleeding problems or blood clots  Respiratory ROS: no cough, shortness of breath, or wheezing  Cardiovascular ROS: no chest pain or dyspnea on exertion  Gastrointestinal ROS: negative  Musculoskeletal ROS: See HPI  Neurological ROS: negative for - bowel and bladder control changes, gait disturbance or numbness/tingling  All other systems reviewed and are negative      PHYSICAL EXAM:  /71   Pulse 83   Temp 98.3 °F (36.8 °C) (Oral)   Resp 16   Ht 5' 8\" (1.727 m)   Wt 145 lb (65.8 kg)   SpO2 98%   BMI 22.05 kg/m²   GENERAL APPEARANCE: Awake and oriented x4. No acute distress, except appropriate to injury. MOOD AND AFFECT: Calm appropriate to situation  HEAD: normocephalic, atraumatic   EYES: equal and reactive to light, Extraocular movements are normal. Pupils are equal in size. Hematologic/Lymphatic: no lymphadenopathy to upper or lower extremity. MSK:  LUE: atraumatic, significant swelling and effusion, minimal ecchymosis to chest and posterior compartment, neutral alignment, sling intact. No lacerations or lesions. TTP at proximal humerus, nontender at clavicle, humeral shaft, elbow, forearm, wrist digits. Able to initiate resisted shoulder abduction, painless pronation supination at elbow wrist, initiate flexion extension but with some discomfort at elbow, wrist rom and digital rom without loss or weakness, radial median ulnar ain pin ax motor intact, radial medin ulnar axillary sensation intact. Labs:   CBC:   Lab Results   Component Value Date/Time    WBC 8.2 01/12/2023 03:49 AM    RBC 2.52 01/12/2023 03:49 AM     BMP:  Lab Results   Component Value Date/Time    GLUCOSE 96 01/12/2023 03:49 AM    GLUCOSE 95 04/12/2016 10:56 AM    CO2 23 01/12/2023 03:49 AM    BUN 13 01/12/2023 03:49 AM    CREATININE 0.5 01/12/2023 03:49 AM    CALCIUM 8.1 01/12/2023 03:49 AM     PT/INR: No results found for: INR, APTT  Type and Screen: No results found for: LABABO, RH, LABANTI  CRP: No results found for: CRP  ESR:   Lab Results   Component Value Date/Time    SEDRATE 7 04/12/2016 10:56 AM     HgBA1c:  No results found for: LABA1C        Radiology:   XR: Final Result       1. Comminuted fracture involving the head and neck of the left humerus. 2. Degenerative change involving the acromioclavicular joint. **This report has been created using voice recognition software. It may contain minor errors which are inherent in voice recognition technology. **         Radiology report reviewed. ASSESSMENT:  74 y.o. female with a left proximal humerus fracture after a mechanical fall 1/10/23, subsequent return to the ED on 1/11/23 after an apparent seizure.    Discussed her fracture at length, will require surgical intervention however will need stabilization from neurology and medicine standpoint, can be performed as outpatient vs inpatient considering clinical course and progress while in house. Patient expressed understanding. PLAN:  -multimodal pain control norco 5/325 q 6 or cyclobenzaprine 10mg tid if okay per primary/ neurology workup  -ice  -sling as tolerates  -NWB LUE  -okay for elbow wrist digital rom, no shoulder rom  -CT L shoulder ordered for possible surgical planning  -potential to perform fixation over weekend if patient still in house, clear for general anesthesia, and overall progression clinical course   -formal timing of potential intervention pending further discussion with attending and CT review    Patient history physical and plan were reviewed with Dr Ld Lucas, he was in agreement with the plan.

## 2023-01-12 NOTE — PROGRESS NOTES
Hospitalist Progress Note       Patient:  Chichi Thacker      Unit/Bed:4A-06/006-A    YOB: 1955    MRN: [id number]       Acct: [de-identified]     PCP: SIVA Buck CNP    Date of Admission: 1/11/2023    Assessment/Plan:     Severe Hypotonic Hyponatremia, w/Seizure-like activity, likely 2/2 volume depletion: Pt on many medications at home for BP, including HCTZ 12.5mg BID + Valsartan 160mg daily. Unknown acuity. Appears euvolemic on exam, however hyaline casts suggest dehydration. Na 119 on presentation to ED, repeat Na 122 at 15:00. Calculated serum osm 240.2, Urine sodium 40, TSH 2.25, Glc 106. UA with specific gravity 1.021, ketones 80, pH 5.0. Started on NS 125ml/h in ED, held and restarted at 50ml/h. Urine Na 40 (RAAS inactive, Na wasting) and Osm ~600 (ADH present) most consistent w/volume depletion. Again, biggest culprit being the thiazide. NAGMA w/Serum Bicarb 22, Cl 81, AG 16 (RTA?)  Na q2hrs while Correcting + BMP daily   Do not correct by >0.5mEq/L/h or >6-8mEq/L/day  C/w IVF - NS 50cc/hr to correct volume depletion  Strict I/Os  Nephrology consulted and following - Received 1g NaCl tablet   Seizure-like activity, witnessed. No history of seizures/seizure disorders. CTH unremarkable, MRI brain with and without contrast-no acute infarct, enhancing lesions, temporal lobes appear normal, EEG normal. Keppra 1250 mg load given. Keppra 750 mg BID - PharmacistBrittany less likely to cause hyponatremia compared to other antiepileptics and is a good option. Neurology s/o w/recs to follow up in 2-4weeks  Uncontrolled HTN, resistant: On home medication Valsartan 160mg BID, HCTZ 12.5mg Daily, Norvasc 2.5mg daily, Coreg 3.125mg BID, and Imdur 15mg daily - most of which were just adjusted per chart review and/or added from [address] [place] [address] [facility] [place] around 10/12/22. Clonidine 0.1mg is also listed as a medication recently re-filled (started back in 2020).    Further medication / Pharmacy evaluation needs to be had in order to properly dose medications  Currently only taking Coreg 3.125mg BID and Imdur 15mg while IP, HCTZ held. BP controlled at this time w/current medications  LURDES, currently on Iron supplementation: Hb range is ~12.1-13 per [facility] documents. 11.7 on arrival, dropped to 8.7 today (1/12/23). Lovenox held in light of the drop. All CBC findings demonstrate a similar drop, appears dilutional from initial hemoconcentration, however cannot r/o possible bleed. C/w holding Lovenox at this time - SCDs instead  H/H at approx 2100 tonight to assess trend  Left Humeral head/neck fracture 2/2 fall: Pain w/minimal control through the L shoulder, worsened w/ROM, relieved by immobilization, no associated paresthesias, no weakness through elbow wrist digits. Ortho consulted and following   Rec pain control w/Norco 5 + Flexeril 10mg TID  CT Left shoulder for surgical planning  Potential intervention if in-patient still    Expected discharge date:  TBD    Disposition:    [x] Home       [] TCU       [] Rehab       [] Psych       [] SNF       [] [place]       [] Other-    Chief Complaint: Seizure like activity     Hospital Course:     76-yo F w/significant PMHx of HTN uncontrolled (On many medications), HLD (Statin qWeekly per Cardiologist), Chronic Diastolic CHF (HFpEF), ?COPD (noted per chart review; no medications), Chronic JONES, LURDES, Hx of EtOH use (~3 alcoholic drinks daily x many years), and recent Left humeral head and neck fracture due to falling in her kitchen who presented to [facility] ED for evaluation of Seizure-like activity. Pt was discharged with [facility] and referral to [facility] w/appointment to be seen day of admission. Pt was sleeping on the chair and woke up at 4 am to take pain medication, went back to sleep w/no issues.   was sleeping next to her, woke up due to hearing her ice pack fall off of her, noticed that she became stiff and started to fall off of the chair.  noted that she was spitting up blood at that time, unsure of how long this episode lasted, but states possibly 5-10 min. She was confused for about 15-20 min after the episode. Both pt/ deny any bowel or bladder incontinence a/w episode. Pt denies biting tongue. Her  recalls a previous seizure-like episode about 4-5 months ago where the pt had a blank stare and began shaking, lasted only a few seconds, and followed by a period confusion for a few minutes after. She states that she has right lower back stiffness and pain in her left arm. Subjective (past 24 hours):      Pt seen and evaluated at bedside in mild distress due to pain in her shoulder. VSS and Na trending in the right direction. Ortho to see pt today for humeral fracture    Medications:  Reviewed    Infusion Medications    [Held by provider] sodium chloride Stopped (01/12/23 0727)    sodium chloride       Scheduled Medications    aspirin  81 mg Oral Daily    carvedilol  3.125 mg Oral BID WC    ferrous sulfate  325 mg Oral Once per day on Mon Wed Fri    cetirizine  10 mg Oral Daily    [Held by provider] losartan-hydroCHLOROthiazide  1 tablet Oral Daily    isosorbide mononitrate  15 mg Oral Daily    sodium chloride flush  5-40 mL IntraVENous 2 times per day    enoxaparin  40 mg SubCUTAneous Q24H    atorvastatin  20 mg Oral Nightly    levETIRAcetam  750 mg Oral BID     PRN Meds: sodium chloride flush, sodium chloride, ondansetron **OR** ondansetron, polyethylene glycol, acetaminophen **OR** acetaminophen, potassium chloride **OR** potassium alternative oral replacement **OR** potassium chloride, magnesium sulfate, ketorolac, LORazepam      Intake/Output Summary (Last 24 hours) at 1/12/2023 0828  Last data filed at 1/12/2023 0616  Gross per 24 hour   Intake 995.34 ml   Output 950 ml   Net 45.34 ml       Diet:  ADULT DIET;  Regular; Low Fat/Low Chol/High Fiber/2 gm Na; 1500 ml    Exam:  /74   Pulse 87   Temp 98.6 °F (37 °C) (Oral)   Resp 14   Ht 5' 8\" (1.727 m)   Wt 145 lb (65.8 kg)   SpO2 98%   BMI 22.05 kg/m²     General appearance: No apparent distress, appears stated age and cooperative. HEENT: Pupils equal, round, and reactive to light. Conjunctivae/corneas clear. Neck: Supple, with full range of motion. No jugular venous distention. Trachea midline. Respiratory:  Normal respiratory effort. Clear to auscultation, bilaterally without Rales/Wheezes/Rhonchi. Cardiovascular: Regular rate and rhythm with normal S1/S2 without murmurs, rubs or gallops. Abdomen: Soft, non-tender, non-distended with normal bowel sounds. Musculoskeletal: passive and active ROM x 3 extremities. TTP LUE w/decreased ROM  Skin: Skin color, texture, turgor normal.    Neurologic:  Neurovascularly intact without any focal sensory/motor deficits. Cranial nerves: II-XII intact, grossly non-focal.  Psychiatric: Alert and oriented, thought content appropriate  Capillary Refill: Brisk,< 3 seconds   Peripheral Pulses: +2 palpable, equal bilaterally       Labs:   Recent Labs     01/11/23  0930 01/12/23  0349   WBC 9.9 8.2   HGB 11.7* 8.7*   HCT 32.7* 24.9*    217     Recent Labs     01/11/23  0930 01/11/23  1500 01/12/23  0232 01/12/23  0349 01/12/23  0629   *   < > 123* 124* 126*   K 3.6  --   --  3.8  --    CL 81*  --   --  88*  --    CO2 22*  --   --  23  --    BUN 11  --   --  13  --    CREATININE 0.4  --   --  0.5  --    CALCIUM 8.7  --   --  8.1*  --     < > = values in this interval not displayed. Recent Labs     01/12/23  0629   AST 22   ALT 12   BILIDIR <0.2   BILITOT 1.0   ALKPHOS 45     No results for input(s): INR in the last 72 hours. Recent Labs     01/11/23  0930   CKTOTAL 103     No results for input(s): PROCAL in the last 72 hours.     Microbiology:      Urinalysis:      Lab Results   Component Value Date/Time    NITRU NEGATIVE 01/11/2023 12:25 PM    WBCUA 5-9 01/11/2023 12:25 PM    BACTERIA NONE SEEN 01/11/2023 12:25 PM RBCUA 0-2 01/11/2023 12:25 PM    BLOODU NEGATIVE 01/11/2023 12:25 PM    SPECGRAV 1.021 01/11/2023 12:25 PM    GLUCOSEU neg 08/04/2014 04:29 PM       Radiology:  MRI BRAIN W WO CONTRAST   Final Result      1. No evidence for an acute infarct. 2. Mild atrophy and probable ischemic changes in the white matter. 3. No definite structural abnormality noted and temporal lobes. **This report has been created using voice recognition software. It may contain minor errors which are inherent in voice recognition technology. **         Final report electronically signed by DR Chon Ingram on 1/11/2023 5:36 PM      CT HEAD WO CONTRAST   Final Result    No evidence of an acute process. **This report has been created using voice recognition software. It may contain minor errors which are inherent in voice recognition technology. **      Final report electronically signed by Dr. Ravi Orozco on 1/11/2023 11:16 AM          DVT prophylaxis: [] Lovenox (held due to drop in Hb)                                 [x] SCDs                                 [] SQ Heparin                                 [] Encourage ambulation         [] Already on Anticoagulation     Code Status: Full Code    Tele:   [x] yes             [] no    Active Hospital Problems    Diagnosis Date Noted    Seizure ([facility] [address] [R56.9] 01/11/2023     Priority: Medium    Hyponatremia [E87.1] 01/11/2023     Priority: Medium    Metabolic acidosis [X12.96] 01/11/2023     Priority: Medium    Essential hypertension [I10]      Priority: Medium       Electronically signed by Chelsea Slaughter MD on 1/12/2023 at 8:28 AM

## 2023-01-12 NOTE — PROGRESS NOTES
Pharmacy Medication History Note      List of current medications patient is taking is complete. Source of information: Surecripts, Pt,  w/ med list    Changes made to medication list:  Medications removed (include reason, ex. therapy complete or physician discontinued):  Removed Calcium Carbonate 500mg to adjust dose below. Removed Norco 5/325 as patient refuses to take again. Removed Naproxen 500mg BID d/t short term usage per pt  Removed miralax 17gm for colonoscopy. Not a home med. Removed Hyzaar 100/25 d/t prescriber discontinuation. It was switched to valsartan and hctz due to a historical backorder. Medications added/doses adjusted: Added Valsartan 160 mg 1 tablet Qday  Added HCTZ 12.5 mg 2 tablets in the morning or 1 tablet in the morning depending on pts blood pressure. Self reported. Added Calcium carbonate to 600mg Qday. Adjusted Allegra frequency from once daily to prn to reflect patient's usage. Adjusted Atorvastatin from 1.5 tablets every week to 0.5 tablets every week per patient and . Adjusted Imdur unspecified 15mg Qday to Imdur ER 30mg take 0.5 (a half) tablet by mouth per day to imdur 15mg 1 tablet per day (may split in half but do not crush or chew) to cleanup the list and clarify it is okay to split. Per [facility] [employer] [facility] administration section. Reconciled discrepancy with patients bottle, surescripts, and patient report. Other notes (ex. Recent course of antibiotics, Coumadin dosing):  Patient likely has very good adherence. She states she takes her blood pressure in the morning and at night before bed. Denies use of other OTC or herbal medications. Rory Escalante  Pharm D.  Candidate 2023  [facility]     Allergies reviewed      Electronically signed by Rory Escalante on 1/12/2023 at 10:24 AM

## 2023-01-12 NOTE — PROGRESS NOTES
[facility]   Nephrology progress note  1/12/2023, 7:59 AM      Pt Name:    Natalie Shields  MRN:     [id number]     YOB: 1955  Admit Date:    1/11/2023  9:14 AM    Chief Complaint: Nephrology following for hyponatremia. Subjective:  Patient seen and examined  No chest pain or shortness of breath  Overall feels okay reasonable urine output      Objective:  24HR INTAKE/OUTPUT:    Intake/Output Summary (Last 24 hours) at 1/12/2023 0759  Last data filed at 1/12/2023 0616  Gross per 24 hour   Intake 995.34 ml   Output 950 ml   Net 45.34 ml      Admission weight: 145 lb (65.8 kg)  Wt Readings from Last 3 Encounters:   01/11/23 145 lb (65.8 kg)   01/11/23 145 lb (65.8 kg)   01/10/23 145 lb (65.8 kg)        Vitals :   Vitals:    01/11/23 2020 01/11/23 2358 01/12/23 0356 01/12/23 0730   BP: 120/64 (!) 109/51 (!) 114/49 130/74   Pulse: 83 84 86 87   Resp: 16 16 14 14   Temp: 98.4 °F (36.9 °C) 98.7 °F (37.1 °C) 98.8 °F (37.1 °C) 98.6 °F (37 °C)   TempSrc: Oral Oral Oral Oral   SpO2: 97% 96% 97% 98%   Weight:       Height:           Physical examination  General Appearance:  Well developed.  No distress  Mouth/Throat:  Oral mucosa moist  Neck:  Supple, no JVD  Lungs:  Breath sounds: clear  Heart[de-identified]  S1,S2 heard  Abdomen:  Soft, non - tender  Musculoskeletal:  Edema -no edema noted    Medications:  Infusion:    [Held by provider] sodium chloride Stopped (01/12/23 0727)    sodium chloride       Meds:    aspirin  81 mg Oral Daily    carvedilol  3.125 mg Oral BID WC    ferrous sulfate  325 mg Oral Once per day on Mon Wed Fri    cetirizine  10 mg Oral Daily    [Held by provider] losartan-hydroCHLOROthiazide  1 tablet Oral Daily    isosorbide mononitrate  15 mg Oral Daily    sodium chloride flush  5-40 mL IntraVENous 2 times per day    enoxaparin  40 mg SubCUTAneous Q24H    atorvastatin  20 mg Oral Nightly    levETIRAcetam  750 mg Oral BID       Lab Data :  CBC:   Recent Labs     01/11/23  0946 01/12/23  0349   WBC 9.9 8.2   HGB 11.7* 8.7*   HCT 32.7* 24.9*    217     CMP:  Recent Labs     01/11/23  0930 01/11/23  1500 01/12/23  0232 01/12/23  0349 01/12/23  0629   *   < > 123* 124* 126*   K 3.6  --   --  3.8  --    CL 81*  --   --  88*  --    CO2 22*  --   --  23  --    BUN 11  --   --  13  --    CREATININE 0.4  --   --  0.5  --    GLUCOSE 106  --   --  96  --    CALCIUM 8.7  --   --  8.1*  --     < > = values in this interval not displayed. Assessment and Plan:  Renal -renal function appears to be stable at baseline  Hyponatremia-based on urine lites she has sodium of 40 and osmolality of almost 600 and elevated  Is more consistent with volume depletion patient was also on hydrochlorothiazide. Has been getting some gentle IV hydration and sodium has rising up to 126 almost 7 mEq correction and 21 hours  Hold IV fluids for now continue fluid restriction continue to monitor every 2 hours sodium checks if rises further may give some hypotonic fluids     Mild acidosis improved  Seizures being followed by neurology  Essential hypertension  Hx of hyperlipidemia  Fracture left humerus  Meds reviewed and discussed with patient and nursing staff  Amado Walter MD  [facility]    This report has been created using voice recognition software.  It may contain minor errors which are inherent in voice recognition technology

## 2023-01-12 NOTE — PROGRESS NOTES
Neurology Progress Note    Date:1/12/2023       ETWF:[id number]  Patient Name:Key Cueva     YOB: 1955     Age:67 y.o. Requesting Physician: Onur Son DO     Reason for Consult:  Evaluate for Seizure      Chief Complaint: Loss of consciousness, stiffening episode    Subjective     Rocío Conti is a 73-year-old  female with past medical history significant for anemia, heart disease, hypertension and a fracture of her left humerus on 1/10/2023 who presented to [facility] emergency department after an episode in which she was observed by her  to have a seizure.  states that they were sleeping in the recliners and he first heard her icepack fall on the floor, when he lifted her she was lying with her right arm and right leg straightened and stiff with eyes rolled back in her head and unresponsive to him. He states that this episode lasted for 5 to 10 minutes and was followed by a 20 to 30-minute period of confusion, weakness. Patient did not lose control of her bowel/bladder, she denies muscle soreness but she does have a right lateral tongue bite.  also states that about a year ago patient was observed to have an episode of unilateral shaking and loss of responsiveness while she was cleaning her blinds. This lasted for only seconds and she was back to normal quickly after that episode so she did not seek evaluation at that time. Patient denies recent illness or head trauma. ED course was significant for finding of hyponatremia to 119 on arrival.      Interval history 1/12/23:  No acute events overnight. Patient with no further episodes of seizure like activity. No new concerns or complaints. Review of Systems   Review of Systems   Eyes:  Negative for visual disturbance. Neurological:  Positive for seizures and weakness. Negative for dizziness, speech difficulty, light-headedness, numbness and headaches.    Psychiatric/Behavioral: Negative for confusion. Medications   Scheduled Meds:    aspirin  81 mg Oral Daily    carvedilol  3.125 mg Oral BID WC    ferrous sulfate  325 mg Oral Once per day on     cetirizine  10 mg Oral Daily    [Held by provider] losartan-hydroCHLOROthiazide  1 tablet Oral Daily    isosorbide mononitrate  15 mg Oral Daily    sodium chloride flush  5-40 mL IntraVENous 2 times per day    enoxaparin  40 mg SubCUTAneous Q24H    atorvastatin  20 mg Oral Nightly    levETIRAcetam  750 mg Oral BID     Continuous Infusions:    [Held by provider] sodium chloride Stopped (23 07)    sodium chloride       PRN Meds: sodium chloride flush, sodium chloride, ondansetron **OR** ondansetron, polyethylene glycol, acetaminophen **OR** acetaminophen, potassium chloride **OR** potassium alternative oral replacement **OR** potassium chloride, magnesium sulfate, ketorolac, LORazepam    Past History    Past Medical History:   has a past medical history of Anemia, Heart disease, and Hypertension. Social History:   reports that she quit smoking about 9 years ago. Her smoking use included cigarettes. She has a 15.00 pack-year smoking history. She has never used smokeless tobacco. She reports current alcohol use. She reports that she does not use drugs. Family History:   Family History   Problem Relation Age of Onset    Heart Disease Father     Arthritis Mother     High Blood Pressure Mother     Rectal Cancer Mother 80       Physical Examination      Vitals:  /74   Pulse 87   Temp 98.6 °F (37 °C) (Oral)   Resp 14   Ht 5' 8\" (1.727 m)   Wt 145 lb (65.8 kg)   SpO2 98%   BMI 22.05 kg/m²   Temp (24hrs), Av.5 °F (36.9 °C), Min:97.8 °F (36.6 °C), Max:98.8 °F (37.1 °C)      I/O (24Hr): Intake/Output Summary (Last 24 hours) at 2023 0738  Last data filed at 2023 0616  Gross per 24 hour   Intake 995.34 ml   Output 950 ml   Net 45.34 ml       Physical Exam  Vitals reviewed.    Constitutional: Appearance: Normal appearance. HENT:      Head: Normocephalic and atraumatic. Right Ear: External ear normal.      Left Ear: External ear normal.      Nose: Nose normal.      Mouth/Throat:      Mouth: Mucous membranes are moist.      Pharynx: Oropharynx is clear. Eyes:      Extraocular Movements: Extraocular movements intact and EOM normal.      Pupils: Pupils are equal, round, and reactive to light. Cardiovascular:      Rate and Rhythm: Normal rate. Pulmonary:      Effort: Pulmonary effort is normal. No respiratory distress. Abdominal:      General: There is no distension. Palpations: Abdomen is soft. Tenderness: There is no abdominal tenderness. Musculoskeletal:         General: Swelling, tenderness and signs of injury present. No deformity. Cervical back: No rigidity or tenderness. Comments: LUE humerus   Skin:     General: Skin is warm and dry. Neurological:      Mental Status: She is alert and oriented to person, place, and time. Coordination: Heel to Shin Test normal. Finger-nose-finger test: Normal on right, deferred on left. Gait: Gait is intact. Psychiatric:         Mood and Affect: Mood normal.         Speech: Speech normal.         Behavior: Behavior normal.         Thought Content: Thought content normal.     Neurologic Exam     Mental Status   Oriented to person, place, and time. Follows 2 step commands. Attention: normal. Concentration: normal.   Speech: speech is normal   Level of consciousness: alert  Knowledge: good. Able to name object. Able to read. Able to repeat. Cranial Nerves     CN II   Visual fields full to confrontation. CN III, IV, VI   Pupils are equal, round, and reactive to light. Extraocular motions are normal.     CN V   Facial sensation intact. CN VII   Facial expression full, symmetric.      CN VIII   CN VIII normal.   Hearing: intact    CN IX, X   CN IX normal.   Palate: symmetric    CN XI   CN XI normal.   Right sternocleidomastoid strength: normal  Left sternocleidomastoid strength: normal  Right trapezius strength: normal  Left trapezius strength: did not test secondary to fractured shoulder. CN XII   CN XII normal.   Tongue: not atrophic    Motor Exam   Muscle bulk: normal  Overall muscle tone: normal  Right arm pronator drift: absent  Strength:  RUE 5/5  LUE 5/5 , other strength testing deferred due to humeral fracture. BLE 5/5  No abnormal motor movement     Sensory Exam   Light touch normal.     Gait, Coordination, and Reflexes     Gait  Gait: normal    Coordination   Finger-nose-finger test: Normal on right, deferred on left. Heel to shin coordination: normal    Tremor   Resting tremor: absent  Intention tremor: absent  Action tremor: absent     Labs/Imaging/Diagnostics   Labs:  CBC:  Recent Labs     01/11/23  0930 01/12/23  0349   WBC 9.9 8.2   RBC 3.43* 2.52*   HGB 11.7* 8.7*   HCT 32.7* 24.9*   MCV 95.3 98.8    217       CHEMISTRIES:  Recent Labs     01/11/23  0930 01/11/23  1500 01/12/23  0232 01/12/23  0349 01/12/23  0629   *   < > 123* 124* 126*   K 3.6  --   --  3.8  --    CL 81*  --   --  88*  --    CO2 22*  --   --  23  --    BUN 11  --   --  13  --    CREATININE 0.4  --   --  0.5  --    GLUCOSE 106  --   --  96  --     < > = values in this interval not displayed. PT/INR:No results for input(s): PROTIME, INR in the last 72 hours. APTT:No results for input(s): APTT in the last 72 hours. LIVER PROFILE:No results for input(s): AST, ALT, BILIDIR, BILITOT, ALKPHOS in the last 72 hours. Imaging Last 24 Hours:  CT HEAD WO CONTRAST    Result Date: 1/11/2023  PROCEDURE: CT HEAD WO CONTRAST CLINICAL INFORMATION: Siecure like activity. Mya Bishop. COMPARISON: No prior study. TECHNIQUE: Noncontrast 5 mm axial images were obtained through the brain. Sagittal and coronal reconstructions were obtained.  All CT scans at this facility use dose modulation, iterative reconstruction, and/or weight-based dosing when appropriate to reduce radiation dose to as low as reasonably achievable. FINDINGS: The brain volume is within acceptable limits. There is no hemorrhage. There are no intra-or extra-axial collections. There is no hydrocephalus, midline shift or mass effect. The gray-white matter differentiation is preserved. The paranasal sinuses and mastoid air cells are normally aerated. There is no suspicious calvarial abnormality. No evidence of an acute process. **This report has been created using voice recognition software. It may contain minor errors which are inherent in voice recognition technology. ** Final report electronically signed by Dr. Patti Krause on 1/11/2023 11:16 AM    XR SHOULDER LEFT (MIN 2 VIEWS)    Result Date: 1/10/2023  PROCEDURE: XR SHOULDER LEFT (MIN 2 VIEWS) CLINICAL INFORMATION: Injury, Pain. COMPARISON: No prior study. TECHNIQUE: 3 views of the shoulder including AP view, a glenoid view, and a scapular Y view. FINDINGS: There is a comminuted fracture involving the head and neck of the humerus. There is degenerative change involving the acromioclavicular joint. The remaining bony structures are intact. The clavicle is normal.  The soft tissues are normal.  There are no suspicious findings in the visualized aspects of the upper lung. 1. Comminuted fracture involving the head and neck of the left humerus. 2. Degenerative change involving the acromioclavicular joint. **This report has been created using voice recognition software. It may contain minor errors which are inherent in voice recognition technology. ** Final report electronically signed by DR Taco Haddad on 1/10/2023 7:40 PM    MRI BRAIN W WO CONTRAST    Result Date: 1/11/2023  PROCEDURE: MRI BRAIN W WO CONTRAST CLINICAL INFORMATIONNew Onset Seizure. COMPARISON: CT scan of the brain dated 11 January 2023.  TECHNIQUE: Multiplanar and multiple spin echo T1 and T2-weighted images were obtained through the brain before and after the administration of intravenous contrast. FINDINGS: The diffusion-weighted images are normal. The brain volume is reduced. There are no intra-or extra-axial collections. There is no hydrocephalus, midline shift or mass effect. On the FLAIR and T2-weighted sequences, there is no structural abnormality noted in the temporal lobes. There are punctate areas of increased signal intensity in the white matter most likely representing ischemic changes. . On the gradient echo T2-weighted images, there are no areas of susceptibility artifact noted There is no abnormal enhancement in the brain. The major intracranial vascular flow voids are present. The midline craniocervical junction structures are normal.  The brainstem and pituitary gland are normal.     1. No evidence for an acute infarct. 2. Mild atrophy and probable ischemic changes in the white matter. 3. No definite structural abnormality noted and temporal lobes. **This report has been created using voice recognition software. It may contain minor errors which are inherent in voice recognition technology. ** Final report electronically signed by DR Isael Mott on 1/11/2023 5:36 PM        Assessment and Plan:        Hospital Problems             Last Modified POA    * (Principal) Seizure ([address] 1/11/2023 Yes    Essential hypertension 1/11/2023 Yes    Hyponatremia 1/12/5223 Yes    Metabolic acidosis 8/01/1412 Yes     Seizure, recurrent  CTH no ICH, midline shift, mass-effect  MRI brain with and without contrast-no acute infarct, enhancing lesions, temporal lobes appear normal  EEG normal  Keppra 1250 mg load given  Keppra 750 mg two times daily  Discussed with pharmacist.  Minor Keys less likely to cause hyponatremia compared to other antiepileptics and is a good option.   Hyponatremia managed per nephrology  Recommend infectious work-up per primary team  Neurology following for EEG results  Will need follow up with outpatient general neurology  Susan in 2-4 weeks. Discussed seizure precautions: No driving, climbing ladders, swimming for 6 months or until released by outpatient neurology. No further work-up or recommendations per neurology, we will sign off at this time. Please call with any questions or if we can be of additional assistance. Thank you for this consult. This was discussed with Dr. Lety De La Garza and he is in agreement with the assessment and plan.     Electronically signed by SIVA Cain CNP on 1/12/23 at 4:15 PM EST

## 2023-01-12 NOTE — PROGRESS NOTES
Patient has no prescription drug coverage. She has been screened for and enrolled in the [facility] program through the outpatient pharmacy. Patient is eligible to receive formulary medications at no cost for 1 year or until income/insurance status change. Program has been explained to the patient and she knows to call with any questions.  For a list of formulary medications, please contact  [address] [place] [address]  [phone]

## 2023-01-13 VITALS
WEIGHT: 145.5 LBS | HEIGHT: 68 IN | BODY MASS INDEX: 22.05 KG/M2 | HEART RATE: 80 BPM | DIASTOLIC BLOOD PRESSURE: 70 MMHG | OXYGEN SATURATION: 100 % | RESPIRATION RATE: 15 BRPM | SYSTOLIC BLOOD PRESSURE: 118 MMHG | TEMPERATURE: 98.4 F

## 2023-01-13 LAB
ABSOLUTE RETIC #: 59 THOU/MM3 (ref 20–115)
ANION GAP SERPL CALCULATED.3IONS-SCNC: 13 MEQ/L (ref 8–16)
BASOPHILS # BLD: 0.3 %
BASOPHILS ABSOLUTE: 0 THOU/MM3 (ref 0–0.1)
BUN BLDV-MCNC: 11 MG/DL (ref 7–22)
CALCIUM SERPL-MCNC: 7.9 MG/DL (ref 8.5–10.5)
CHLORIDE BLD-SCNC: 94 MEQ/L (ref 98–111)
CO2: 23 MEQ/L (ref 23–33)
CREAT SERPL-MCNC: 0.4 MG/DL (ref 0.4–1.2)
EOSINOPHIL # BLD: 0.7 %
EOSINOPHILS ABSOLUTE: 0.1 THOU/MM3 (ref 0–0.4)
ERYTHROCYTE [DISTWIDTH] IN BLOOD BY AUTOMATED COUNT: 12.6 % (ref 11.5–14.5)
ERYTHROCYTE [DISTWIDTH] IN BLOOD BY AUTOMATED COUNT: 46.3 FL (ref 35–45)
FERRITIN: 479 NG/ML (ref 10–291)
FOLATE: > 20 NG/ML (ref 4.8–24.2)
GFR SERPL CREATININE-BSD FRML MDRD: > 60 ML/MIN/1.73M2
GLUCOSE BLD-MCNC: 102 MG/DL (ref 70–108)
HCT VFR BLD CALC: 23.2 % (ref 37–47)
HEMOGLOBIN: 8 GM/DL (ref 12–16)
IMMATURE GRANS (ABS): 0.02 THOU/MM3 (ref 0–0.07)
IMMATURE GRANULOCYTES: 0.3 %
IMMATURE RETIC FRACT: 12.3 % (ref 3–15.9)
IRON SATURATION: 10 % (ref 20–50)
IRON: 23 UG/DL (ref 50–170)
LYMPHOCYTES # BLD: 7 %
LYMPHOCYTES ABSOLUTE: 0.5 THOU/MM3 (ref 1–4.8)
MCH RBC QN AUTO: 34.6 PG (ref 26–33)
MCHC RBC AUTO-ENTMCNC: 34.5 GM/DL (ref 32.2–35.5)
MCV RBC AUTO: 100.4 FL (ref 81–99)
MONOCYTES # BLD: 15.6 %
MONOCYTES ABSOLUTE: 1.2 THOU/MM3 (ref 0.4–1.3)
NUCLEATED RED BLOOD CELLS: 0 /100 WBC
PLATELET # BLD: 183 THOU/MM3 (ref 130–400)
PMV BLD AUTO: 10 FL (ref 9.4–12.4)
POTASSIUM REFLEX MAGNESIUM: 3.6 MEQ/L (ref 3.5–5.2)
RBC # BLD: 2.31 MILL/MM3 (ref 4.2–5.4)
RETIC HEMOGLOBIN: 37.2 PG (ref 28.2–35.7)
RETICULOCYTE ABSOLUTE COUNT: 2.6 % (ref 0.5–2)
SEG NEUTROPHILS: 76.1 %
SEGMENTED NEUTROPHILS ABSOLUTE COUNT: 5.7 THOU/MM3 (ref 1.8–7.7)
SODIUM BLD-SCNC: 130 MEQ/L (ref 135–145)
SODIUM BLD-SCNC: 130 MEQ/L (ref 135–145)
SODIUM BLD-SCNC: 131 MEQ/L (ref 135–145)
SODIUM BLD-SCNC: 133 MEQ/L (ref 135–145)
TOTAL IRON BINDING CAPACITY: 223 UG/DL (ref 171–450)
VITAMIN B-12: 1110 PG/ML (ref 211–911)
WBC # BLD: 7.5 THOU/MM3 (ref 4.8–10.8)

## 2023-01-13 PROCEDURE — 36415 COLL VENOUS BLD VENIPUNCTURE: CPT

## 2023-01-13 PROCEDURE — 6370000000 HC RX 637 (ALT 250 FOR IP): Performed by: SOCIAL WORKER

## 2023-01-13 PROCEDURE — 83540 ASSAY OF IRON: CPT

## 2023-01-13 PROCEDURE — 82728 ASSAY OF FERRITIN: CPT

## 2023-01-13 PROCEDURE — 6360000002 HC RX W HCPCS

## 2023-01-13 PROCEDURE — 82746 ASSAY OF FOLIC ACID SERUM: CPT

## 2023-01-13 PROCEDURE — 85046 RETICYTE/HGB CONCENTRATE: CPT

## 2023-01-13 PROCEDURE — 84295 ASSAY OF SERUM SODIUM: CPT

## 2023-01-13 PROCEDURE — 99232 SBSQ HOSP IP/OBS MODERATE 35: CPT | Performed by: INTERNAL MEDICINE

## 2023-01-13 PROCEDURE — 85025 COMPLETE CBC W/AUTO DIFF WBC: CPT

## 2023-01-13 PROCEDURE — 6370000000 HC RX 637 (ALT 250 FOR IP)

## 2023-01-13 PROCEDURE — 82607 VITAMIN B-12: CPT

## 2023-01-13 PROCEDURE — 99239 HOSP IP/OBS DSCHRG MGMT >30: CPT | Performed by: INTERNAL MEDICINE

## 2023-01-13 PROCEDURE — 6370000000 HC RX 637 (ALT 250 FOR IP): Performed by: INTERNAL MEDICINE

## 2023-01-13 PROCEDURE — 80048 BASIC METABOLIC PNL TOTAL CA: CPT

## 2023-01-13 PROCEDURE — 83550 IRON BINDING TEST: CPT

## 2023-01-13 RX ORDER — CARVEDILOL 12.5 MG/1
12.5 TABLET ORAL 2 TIMES DAILY WITH MEALS
COMMUNITY

## 2023-01-13 RX ORDER — CLONIDINE HYDROCHLORIDE 0.1 MG/1
0.1 TABLET ORAL 2 TIMES DAILY PRN
COMMUNITY
Start: 2020-06-25

## 2023-01-13 RX ORDER — AMLODIPINE BESYLATE 2.5 MG/1
2.5 TABLET ORAL DAILY
Status: ON HOLD | COMMUNITY
Start: 2020-11-02 | End: 2023-01-13 | Stop reason: HOSPADM

## 2023-01-13 RX ORDER — SODIUM CHLORIDE 1000 MG
1 TABLET, SOLUBLE MISCELLANEOUS 2 TIMES DAILY WITH MEALS
Status: DISCONTINUED | OUTPATIENT
Start: 2023-01-13 | End: 2023-01-13 | Stop reason: HOSPADM

## 2023-01-13 RX ORDER — LEVETIRACETAM 750 MG/1
750 TABLET ORAL 2 TIMES DAILY
Qty: 60 TABLET | Refills: 3 | Status: SHIPPED | OUTPATIENT
Start: 2023-01-13

## 2023-01-13 RX ORDER — LANOLIN ALCOHOL/MO/W.PET/CERES
CREAM (GRAM) TOPICAL
COMMUNITY

## 2023-01-13 RX ORDER — VALSARTAN 160 MG/1
160 TABLET ORAL DAILY
Qty: 30 TABLET | Refills: 3 | Status: SHIPPED
Start: 2023-01-13

## 2023-01-13 RX ORDER — SODIUM CHLORIDE 1000 MG
1 TABLET, SOLUBLE MISCELLANEOUS 2 TIMES DAILY WITH MEALS
Qty: 60 TABLET | Refills: 1 | Status: SHIPPED | OUTPATIENT
Start: 2023-01-13 | End: 2023-01-14

## 2023-01-13 RX ORDER — IBUPROFEN 600 MG/1
600 TABLET ORAL EVERY 8 HOURS PRN
Qty: 40 TABLET | Refills: 0 | Status: SHIPPED | OUTPATIENT
Start: 2023-01-13

## 2023-01-13 RX ADMIN — ISOSORBIDE MONONITRATE 15 MG: 30 TABLET, EXTENDED RELEASE ORAL at 09:09

## 2023-01-13 RX ADMIN — CARVEDILOL 3.12 MG: 3.12 TABLET, FILM COATED ORAL at 09:10

## 2023-01-13 RX ADMIN — LEVETIRACETAM 750 MG: 500 TABLET, FILM COATED ORAL at 09:09

## 2023-01-13 RX ADMIN — ASPIRIN 81 MG: 81 TABLET, COATED ORAL at 09:10

## 2023-01-13 RX ADMIN — KETOROLAC TROMETHAMINE 15 MG: 30 INJECTION, SOLUTION INTRAMUSCULAR; INTRAVENOUS at 01:46

## 2023-01-13 RX ADMIN — KETOROLAC TROMETHAMINE 15 MG: 30 INJECTION, SOLUTION INTRAMUSCULAR; INTRAVENOUS at 09:16

## 2023-01-13 RX ADMIN — SODIUM CHLORIDE 1 G: 1 TABLET ORAL at 11:14

## 2023-01-13 ASSESSMENT — PAIN DESCRIPTION - LOCATION
LOCATION: BACK
LOCATION: SHOULDER

## 2023-01-13 ASSESSMENT — PAIN - FUNCTIONAL ASSESSMENT: PAIN_FUNCTIONAL_ASSESSMENT: ACTIVITIES ARE NOT PREVENTED

## 2023-01-13 ASSESSMENT — PAIN DESCRIPTION - DESCRIPTORS
DESCRIPTORS: ACHING;DISCOMFORT
DESCRIPTORS: ACHING

## 2023-01-13 ASSESSMENT — PAIN DESCRIPTION - ORIENTATION
ORIENTATION: LOWER
ORIENTATION: LEFT

## 2023-01-13 ASSESSMENT — PAIN SCALES - GENERAL
PAINLEVEL_OUTOF10: 7
PAINLEVEL_OUTOF10: 2
PAINLEVEL_OUTOF10: 0

## 2023-01-13 NOTE — PROGRESS NOTES
[facility]   Nephrology progress note  1/13/2023, 10:11 AM      Pt Name:    Luda Real  MRN:     [id number]     YOB: 1955  Admit Date:    1/11/2023  9:14 AM    Chief Complaint: Nephrology following for hyponatremia. Subjective:  Patient seen and examined  No chest pain or shortness of breath  Overall feels okay reasonable urine output      Objective:  24HR INTAKE/OUTPUT:    Intake/Output Summary (Last 24 hours) at 1/13/2023 1011  Last data filed at 1/13/2023 1538  Gross per 24 hour   Intake 1152.7 ml   Output 0 ml   Net 1152.7 ml        Admission weight: 145 lb (65.8 kg)  Wt Readings from Last 3 Encounters:   01/13/23 145 lb 8 oz (66 kg)   01/11/23 145 lb (65.8 kg)   01/10/23 145 lb (65.8 kg)        Vitals :   Vitals:    01/12/23 2000 01/12/23 2344 01/13/23 0341 01/13/23 0755   BP: 119/68 122/86 126/71 129/79   Pulse: 81 82 90 93   Resp: 18 18 18 16   Temp: 98.7 °F (37.1 °C) 98.4 °F (36.9 °C) 98.7 °F (37.1 °C) 98.2 °F (36.8 °C)   TempSrc: Oral Oral Oral Oral   SpO2: 97% 97% 97%    Weight:   145 lb 8 oz (66 kg)    Height:           Physical examination  General Appearance:  Well developed.  No distress  Mouth/Throat:  Oral mucosa moist  Neck:  Supple, no JVD  Lungs:  Breath sounds: clear  Heart[de-identified]  S1,S2 heard  Abdomen:  Soft, non - tender  Musculoskeletal:  Edema -no edema noted    Medications:  Infusion:    sodium chloride 60 mL/hr at 01/13/23 4664    sodium chloride       Meds:    aspirin  81 mg Oral Daily    carvedilol  3.125 mg Oral BID WC    ferrous sulfate  325 mg Oral Once per day on Mon Wed Fri    cetirizine  10 mg Oral Daily    isosorbide mononitrate  15 mg Oral Daily    sodium chloride flush  5-40 mL IntraVENous 2 times per day    [Held by provider] enoxaparin  40 mg SubCUTAneous Q24H    atorvastatin  20 mg Oral Nightly    levETIRAcetam  750 mg Oral BID       Lab Data :  CBC:   Recent Labs     01/11/23  0930 01/12/23  0349 01/12/23  2124 01/13/23  0357   WBC 9.9 8.2  --  7.5   HGB 11.7* 8.7* 9.0* 8.0*   HCT 32.7* 24.9* 26.6* 23.2*    217  --  183       CMP:  Recent Labs     01/11/23  0930 01/11/23  1500 01/12/23  0349 01/12/23  0629 01/13/23  0207 01/13/23  0357 01/13/23  0821   *   < > 124*   < > 130* 130* 131*   K 3.6  --  3.8  --   --  3.6  --    CL 81*  --  88*  --   --  94*  --    CO2 22*  --  23  --   --  23  --    BUN 11  --  13  --   --  11  --    CREATININE 0.4  --  0.5  --   --  0.4  --    GLUCOSE 106  --  96  --   --  102  --    CALCIUM 8.7  --  8.1*  --   --  7.9*  --     < > = values in this interval not displayed. Assessment and Plan:  Renal -renal function appears to be stable at baseline  Hyponatremia-based on urine lites she has sodium of 40 and osmolality of almost 600 and elevated  Is more consistent with volume depletion patient was also on hydrochlorothiazide. Sodium has improved with IV fluids and addition of some salt tablets  Continue the patient on 1500 mm fluid restriction even at home restart sodium chloride tablets 1 g twice daily  For now continue IV fluids     Mild acidosis improved  Seizures being followed by neurology  Essential hypertension  Hx of hyperlipidemia  Fracture left humerus  Meds reviewed and discussed with patient and nursing staff    If patient is discharge please send the patient home on sodium chloride tablets 1 g twice daily and follow-up in my office in 2 to 3 weeks with a BMP prior and also 1500 mm fluid restriction    Emma Connelly MD  [facility]    This report has been created using voice recognition software.  It may contain minor errors which are inherent in voice recognition technology

## 2023-01-13 NOTE — PROGRESS NOTES
CLINICAL PHARMACY: DISCHARGE MED RECONCILIATION/REVIEW    [facility]) Select Patient?: No  Total # of Interventions Recommended: 0   -   Total # Interventions Accepted: 0  Intervention Severity:   - Level 1 Intervention Present?: No   - Level 2 #: 0   - Level 3 #: 0   Time Spent (min): 15    Additional Documentation:

## 2023-01-13 NOTE — DISCHARGE SUMMARY
Discharge Summary     Patient Identification:  Ysabel Coburn  : 1955  MRN: [id number]   Account: [de-identified]     Admit date: 2023  Discharge date: 2023   Attending provider: No att. providers found        Primary care provider: SIVA Simon CNP     Discharge Diagnoses:   Principal Problem:    Seizure [facility])  Active Problems:    Essential hypertension    Hyponatremia    Metabolic acidosis  Resolved Problems:    * No resolved hospital problems. *    Severe Hypotonic Hyponatremia, w/Seizure-like activity, likely 2/2 volume depletion (resolved): Pt on many medications at home for BP, including HCTZ 12.5mg BID + Valsartan 160mg daily. Unknown acuity. Appears euvolemic on exam, however hyaline casts suggest dehydration. Na 119 on presentation to ED, repeat Na 122 at 15:00. Calculated serum osm 240.2, Urine sodium 40, TSH 2.25, Glc 106. UA with specific gravity 1.021, ketones 80, pH 5.0. Started on NS 125ml/h in ED, held and restarted at 50ml/h. Urine Na 40 (RAAS inactive, Na wasting) and Osm ~600 (ADH present) most consistent w/volume depletion. Again, biggest culprit being the thiazide. NAGMA w/Serum Bicarb 22, Cl 81, AG 16 (RTA?). Nephrology consulted and following - Received 1g NaCl tablet. Seizure-like activity, witnessed. No history of seizures/seizure disorders. CTH unremarkable, MRI brain with and without contrast-no acute infarct, enhancing lesions, temporal lobes appear normal, EEG normal. Keppra 1250 mg load given. Keppra 750 mg BID - Pharmacist, Turner Majestic less likely to cause hyponatremia compared to other antiepileptics and is a good option.  Neurology s/o w/recs to follow up in 2-4weeks  Per Nephro - 1500cc fluid restriction + NaCl tabs 1g BID  F/u in Nephro office in 2-3 weeks w/BMP prior   Uncontrolled HTN, resistant: On home medication Valsartan 160mg BID, HCTZ 12.5mg Daily, Norvasc 2.5mg daily, Coreg 3.125mg BID, and Imdur 15mg daily - most of which were just adjusted per chart review and/or added from [address] [place] [address] and [facility] around 10/12/22. Clonidine 0.1mg is also listed as a medication recently re-filled (started back in 2020). Further medication / Pharmacy evaluation needs to be had in order to properly dose medications. Currently only taking Coreg 3.125mg BID and Imdur 15mg while IP, HCTZ held. BP controlled at this time w/current medications  LURDES, currently on Iron supplementation: Hb range is ~12.1-13 per [facility] documents. 11.7 on arrival, dropped to 8.7 today (1/12/23). Lovenox held in light of the drop. All CBC findings demonstrate a similar drop, appears dilutional from initial hemoconcentration, however cannot r/o possible bleed. Left Humeral head/neck fracture 2/2 fall: Pain w/minimal control through the L shoulder, worsened w/ROM, relieved by immobilization, no associated paresthesias, no weakness through elbow wrist digits. Ortho consulted and following - Rec pain control w/Norco 5 + Flexeril 10mg TID  CT Left shoulder for surgical planning - pt refused 1/13/23  NWB LUE, maintain sling, elbow wrist digital ROM okay  F/u with Dr Yasmin Andrews on 1/16/23      Hospital Course:   76-yo F w/significant PMHx of HTN uncontrolled (On many medications), HLD (Statin qWeekly per Cardiologist), Chronic Diastolic CHF (HFpEF), ?COPD (noted per chart review; no medications), Chronic JONES, LURDES, Hx of EtOH use (~3 alcoholic drinks daily x many years), and recent Left humeral head and neck fracture due to falling in her kitchen who presented to [facility] ED for evaluation of Seizure-like activity. Pt was discharged with [facility] Rx and referral to [facility] w/appointment to be seen day of admission. Pt was sleeping on the chair and woke up at 4 am to take pain medication, went back to sleep w/no issues.  was sleeping next to her, woke up due to hearing her ice pack fall off of her, noticed that she became stiff and started to fall off of the chair.   noted that she was spitting up blood at that time, unsure of how long this episode lasted, but states possibly 5-10 min. She was confused for about 15-20 min after the episode. Both pt/ deny any bowel or bladder incontinence a/w episode. Pt denies biting tongue. Her  recalls a previous seizure-like episode about 4-5 months ago where the pt had a blank stare and began shaking, lasted only a few seconds, and followed by a period confusion for a few minutes after. She states that she has right lower back stiffness and pain in her left arm. Pt adamantly refusing in house CT of shoulder. Na >130 for consecutive draws, Nephro ok w/level at this time since pt wanting to leave. Ortho ok w/discharge per recs above in A/P. Pt stable and safe at this time for d/c. Follows up have been scheduled per specialist requests.        Discharge Medications:     Medication List        START taking these medications      ibuprofen 600 MG tablet  Commonly known as: ADVIL;MOTRIN  Take 1 tablet by mouth every 8 hours as needed for Pain     levETIRAcetam 750 MG tablet  Commonly known as: KEPPRA  Take 1 tablet by mouth 2 times daily     sodium chloride 1 g tablet  Take 1 tablet by mouth 2 times daily (with meals) for 1 dose            CHANGE how you take these medications      valsartan 160 MG tablet  Commonly known as: DIOVAN  Take 1 tablet by mouth daily  What changed: when to take this            CONTINUE taking these medications      aspirin 81 MG tablet     atorvastatin 20 MG tablet  Commonly known as: LIPITOR     calcium carbonate 600 MG Tabs tablet     carvedilol 12.5 MG tablet  Commonly known as: COREG     cloNIDine 0.1 MG tablet  Commonly known as: CATAPRES     ferrous sulfate 325 (65 Fe) MG EC tablet  Commonly known as: FE TABS 325     fexofenadine 180 MG tablet  Commonly known as: ALLEGRA     folic acid 561 MCG tablet  Commonly known as: FOLVITE     isosorbide mononitrate 30 MG extended release tablet  Commonly known as: IMDUR     multivitamin per tablet     vitamin E 400 UNIT capsule            STOP taking these medications      amLODIPine 2.5 MG tablet  Commonly known as: NORVASC     hydroCHLOROthiazide 12.5 MG tablet  Commonly known as: HYDRODIURIL     HYDROcodone-acetaminophen 5-325 MG per tablet  Commonly known as: Norco     naproxen 500 MG tablet  Commonly known as: Naprosyn               Where to Get Your Medications        These medications were sent to [address] [place] [address] , [address] [facility] 1[address] 3 [facility]  [address], [address] [address]      Phone: [phone]   ibuprofen 600 MG tablet  levETIRAcetam 750 MG tablet  sodium chloride 1 g tablet       Information about where to get these medications is not yet available    Ask your nurse or doctor about these medications  valsartan 160 MG tablet         Patient Instructions:    Diet: No diet orders on file    Code Status: Prior    Follow-up visits:   Rodríguez Beasley MD  [address] [facility],Third Floor [address] [facility]    Follow up in 1 month(s)      Shreya Castle, APRN - CNP  [facility]  [address] [address]  [phone]    Follow up      Adelaida Day [facility] [address]  [phone]    Follow up on 1/16/2023      Emma Connelly MD  [address] [facility] [address]  [facility]  [phone]    Follow up in 2 week(s)     Examination:  Vitals:  Vitals:    01/12/23 2344 01/13/23 0341 01/13/23 0755 01/13/23 1149   BP:  126/71 129/79 118/70   Pulse:  90 93 80   Resp:  18 16 15   Temp: 98.4 °F (36.9 °C) 98.7 °F (37.1 °C) 98.2 °F (36.8 °C) 98.4 °F (36.9 °C)   TempSrc: Oral Oral Oral Oral   SpO2: 97% 97%  100%   Weight:  145 lb 8 oz (66 kg)     Height:         Weight: Weight: 145 lb 8 oz (66 kg)     24 hour intake/output:  Intake/Output Summary (Last 24 hours) at 1/13/2023 1941  Last data filed at 1/13/2023 8949  Gross per 24 hour   Intake 1152.7 ml   Output 0 ml   Net 1152.7 ml     General appearance: No apparent distress, appears stated age and cooperative. HEENT: Pupils equal, round, and reactive to light. Conjunctivae/corneas clear. Neck: Supple, with full range of motion. No jugular venous distention. Trachea midline. Respiratory:  Normal respiratory effort. Clear to auscultation, bilaterally without Rales/Wheezes/Rhonchi. Cardiovascular: Regular rate and rhythm with normal S1/S2 without murmurs, rubs or gallops. Abdomen: Soft, non-tender, non-distended with normal bowel sounds. Musculoskeletal: passive and active ROM x 3 extremities. TTP LUE w/decreased ROM  Skin: Skin color, texture, turgor normal.    Neurologic:  Neurovascularly intact without any focal sensory/motor deficits. Cranial nerves: II-XII intact, grossly non-focal.  Psychiatric: Alert and oriented, thought content appropriate  Capillary Refill: Brisk,< 3 seconds   Peripheral Pulses: +2 palpable, equal bilaterally    Significant Diagnostics:   Radiology: CT HEAD WO CONTRAST    Result Date: 1/11/2023  PROCEDURE: CT HEAD WO CONTRAST CLINICAL INFORMATION: Siecure like activity. Earvin Seller. COMPARISON: No prior study. TECHNIQUE: Noncontrast 5 mm axial images were obtained through the brain. Sagittal and coronal reconstructions were obtained. All CT scans at this facility use dose modulation, iterative reconstruction, and/or weight-based dosing when appropriate to reduce radiation dose to as low as reasonably achievable. FINDINGS: The brain volume is within acceptable limits. There is no hemorrhage. There are no intra-or extra-axial collections. There is no hydrocephalus, midline shift or mass effect. The gray-white matter differentiation is preserved. The paranasal sinuses and mastoid air cells are normally aerated. There is no suspicious calvarial abnormality. No evidence of an acute process. **This report has been created using voice recognition software. It may contain minor errors which are inherent in voice recognition technology. ** Final report electronically signed by Dr. Ravi Orozco on 1/11/2023 11:16 AM    MRI BRAIN W WO CONTRAST    Result Date: 1/11/2023  PROCEDURE: MRI BRAIN W WO CONTRAST CLINICAL INFORMATIONNew Onset Seizure. COMPARISON: CT scan of the brain dated 11 January 2023. TECHNIQUE: Multiplanar and multiple spin echo T1 and T2-weighted images were obtained through the brain before and after the administration of intravenous contrast. FINDINGS: The diffusion-weighted images are normal. The brain volume is reduced. There are no intra-or extra-axial collections. There is no hydrocephalus, midline shift or mass effect. On the FLAIR and T2-weighted sequences, there is no structural abnormality noted in the temporal lobes. There are punctate areas of increased signal intensity in the white matter most likely representing ischemic changes. . On the gradient echo T2-weighted images, there are no areas of susceptibility artifact noted There is no abnormal enhancement in the brain. The major intracranial vascular flow voids are present. The midline craniocervical junction structures are normal.  The brainstem and pituitary gland are normal.     1. No evidence for an acute infarct. 2. Mild atrophy and probable ischemic changes in the white matter. 3. No definite structural abnormality noted and temporal lobes. **This report has been created using voice recognition software. It may contain minor errors which are inherent in voice recognition technology. ** Final report electronically signed by DR Chon Ingram on 1/11/2023 5:36 PM      Labs:   Recent Results (from the past 72 hour(s))   EKG Emergency    Collection Time: 01/11/23  9:19 AM   Result Value Ref Range    Ventricular Rate 92 BPM    Atrial Rate 92 BPM    P-R Interval 172 ms    QRS Duration 66 ms    Q-T Interval 370 ms    QTc Calculation (Bazett) 457 ms    P Axis 75 degrees    R Axis 27 degrees    T Axis 36 degrees   CBC with Auto Differential    Collection Time: 01/11/23  9:30 AM   Result Value Ref Range    WBC 9.9 4.8 - 10.8 thou/mm3    RBC 3.43 (L) 4.20 - 5.40 mill/mm3    Hemoglobin 11.7 (L) 12.0 - 16.0 gm/dl    Hematocrit 32.7 (L) 37.0 - 47.0 %    MCV 95.3 81.0 - 99.0 fL    MCH 34.1 (H) 26.0 - 33.0 pg    MCHC 35.8 (H) 32.2 - 35.5 gm/dl    RDW-CV 12.1 11.5 - 14.5 %    RDW-SD 42.5 35.0 - 45.0 fL    Platelets 943 777 - 259 thou/mm3    MPV 9.6 9.4 - 12.4 fL    Seg Neutrophils 85.3 %    Lymphocytes 6.5 %    Monocytes 6.9 %    Eosinophils 0.1 %    Basophils 0.2 %    Immature Granulocytes 1.0 %    Segs Absolute 8.4 (H) 1.8 - 7.7 thou/mm3    Lymphocytes Absolute 0.6 (L) 1.0 - 4.8 thou/mm3    Monocytes Absolute 0.7 0.4 - 1.3 thou/mm3    Eosinophils Absolute 0.0 0.0 - 0.4 thou/mm3    Basophils Absolute 0.0 0.0 - 0.1 thou/mm3    Immature Grans (Abs) 0.10 (H) 0.00 - 0.07 thou/mm3    nRBC 0 /100 wbc   Basic Metabolic Panel    Collection Time: 01/11/23  9:30 AM   Result Value Ref Range    Sodium 119 (LL) 135 - 145 meq/L    Potassium 3.6 3.5 - 5.2 meq/L    Chloride 81 (L) 98 - 111 meq/L    CO2 22 (L) 23 - 33 meq/L    Glucose 106 70 - 108 mg/dL    BUN 11 7 - 22 mg/dL    Creatinine 0.4 0.4 - 1.2 mg/dL    Calcium 8.7 8.5 - 10.5 mg/dL   TSH    Collection Time: 01/11/23  9:30 AM   Result Value Ref Range    TSH 2.250 0.400 - 4.200 uIU/mL   CK    Collection Time: 01/11/23  9:30 AM   Result Value Ref Range    Total  30 - 135 U/L   Troponin    Collection Time: 01/11/23  9:30 AM   Result Value Ref Range    Troponin T < 0.010 ng/ml   Anion Gap    Collection Time: 01/11/23  9:30 AM   Result Value Ref Range    Anion Gap 16.0 8.0 - 16.0 meq/L   Glomerular Filtration Rate, Estimated    Collection Time: 01/11/23  9:30 AM   Result Value Ref Range    Est, Glom Filt Rate >60 >60 ml/min/1.73m2   Osmolality    Collection Time: 01/11/23  9:30 AM   Result Value Ref Range    Osmolality Calc 240.2 (L) 275.0 - 300.0 mOsmol/kg   Osmolality, Serum    Collection Time: 01/11/23  9:30 AM   Result Value Ref Range    Osmolality 256 (L) 275 - 295 mosmol/kg   Urinalysis with Microscopic    Collection Time: 01/11/23 12:25 PM   Result Value Ref Range    Glucose, Urine NEGATIVE NEGATIVE mg/dl    Bilirubin Urine NEGATIVE NEGATIVE    Ketones, Urine 80 (A) NEGATIVE    Specific Gravity, UA 1.021 1.002 - 1.030    Blood, Urine NEGATIVE NEGATIVE    pH, UA 5.0 5.0 - 9.0    Protein, UA NEGATIVE NEGATIVE mg/dl    Urobilinogen, Urine 0.2 0.0 - 1.0 eu/dl    Nitrite, Urine NEGATIVE NEGATIVE    Leukocyte Esterase, Urine TRACE (A) NEGATIVE    Color, UA YELLOW YELLOW-STRAW    Character, Urine CLEAR [id number]    RBC, UA 0-2 0-2/hpf /hpf    WBC, UA 5-9 0-4/hpf /hpf    Epithelial Cells, UA 10-15 3-5/hpf /hpf    Bacteria, UA NONE SEEN FEW/NONE SEEN    Casts 4-8 HYALINE NONE SEEN /lpf    Crystals NONE SEEN NONE SEEN    Renal Epithelial, UA NONE SEEN NONE SEEN    Yeast, UA NONE SEEN NONE SEEN    Casts NONE SEEN /lpf    Miscellaneous Lab Test Result NONE SEEN    Sodium, urine, random    Collection Time: 01/11/23 12:25 PM   Result Value Ref Range    Sodium, Ur 40 meq/l   Osmolality, Urine    Collection Time: 01/11/23 12:25 PM   Result Value Ref Range    Osmolality, Ur 598 250 - 750 mosmol/kg   Sodium    Collection Time: 01/11/23  3:00 PM   Result Value Ref Range    Sodium 122 (L) 135 - 145 meq/L   Urine Drug Screen    Collection Time: 01/11/23  4:00 PM   Result Value Ref Range    Amphetamine+Methamphetamine Urine Screen Negative NEGATIVE    Barbiturate Quant, Ur Negative NEGATIVE    Benzodiazepine Quant, Ur Negative NEGATIVE    Cannabinoid Quant, Ur Negative NEGATIVE    Cocaine Metab Quant, Ur Negative NEGATIVE    Opiates, Urine Negative NEGATIVE    Oxycodone Negative NEGATIVE    PCP Quant, Ur Negative NEGATIVE    Fentanyl POSITIVE NEGATIVE   Sodium, urine, random    Collection Time: 01/11/23  4:00 PM   Result Value Ref Range    Sodium, Ur 35 meq/l   Creatinine, Random Urine    Collection Time: 01/11/23  4:00 PM   Result Value Ref Range    Creatinine, Urine 44.8 mg/dl Osmolality, Urine    Collection Time: 01/11/23  4:00 PM   Result Value Ref Range    Osmolality, Ur 351 250 - 750 mosmol/kg   Sodium    Collection Time: 01/11/23  6:31 PM   Result Value Ref Range    Sodium 121 (L) 135 - 145 meq/L   Sodium    Collection Time: 01/11/23  8:55 PM   Result Value Ref Range    Sodium 121 (L) 135 - 145 meq/L   Sodium    Collection Time: 01/11/23 10:41 PM   Result Value Ref Range    Sodium 122 (L) 135 - 145 meq/L   Sodium    Collection Time: 01/12/23 12:14 AM   Result Value Ref Range    Sodium 122 (L) 135 - 145 meq/L   Sodium    Collection Time: 01/12/23  2:32 AM   Result Value Ref Range    Sodium 123 (L) 135 - 145 meq/L   Basic Metabolic Panel w/ Reflex to MG    Collection Time: 01/12/23  3:49 AM   Result Value Ref Range    Sodium 124 (L) 135 - 145 meq/L    Potassium reflex Magnesium 3.8 3.5 - 5.2 meq/L    Chloride 88 (L) 98 - 111 meq/L    CO2 23 23 - 33 meq/L    Glucose 96 70 - 108 mg/dL    BUN 13 7 - 22 mg/dL    Creatinine 0.5 0.4 - 1.2 mg/dL    Calcium 8.1 (L) 8.5 - 10.5 mg/dL   CBC with Auto Differential    Collection Time: 01/12/23  3:49 AM   Result Value Ref Range    WBC 8.2 4.8 - 10.8 thou/mm3    RBC 2.52 (L) 4.20 - 5.40 mill/mm3    Hemoglobin 8.7 (L) 12.0 - 16.0 gm/dl    Hematocrit 24.9 (L) 37.0 - 47.0 %    MCV 98.8 81.0 - 99.0 fL    MCH 34.5 (H) 26.0 - 33.0 pg    MCHC 34.9 32.2 - 35.5 gm/dl    RDW-CV 12.6 11.5 - 14.5 %    RDW-SD 45.5 (H) 35.0 - 45.0 fL    Platelets 595 988 - 631 thou/mm3    MPV 9.7 9.4 - 12.4 fL    Seg Neutrophils 78.5 %    Lymphocytes 9.3 %    Monocytes 11.8 %    Eosinophils 0.1 %    Basophils 0.1 %    Immature Granulocytes 0.2 %    Segs Absolute 6.4 1.8 - 7.7 thou/mm3    Lymphocytes Absolute 0.8 (L) 1.0 - 4.8 thou/mm3    Monocytes Absolute 1.0 0.4 - 1.3 thou/mm3    Eosinophils Absolute 0.0 0.0 - 0.4 thou/mm3    Basophils Absolute 0.0 0.0 - 0.1 thou/mm3    Immature Grans (Abs) 0.02 0.00 - 0.07 thou/mm3    nRBC 0 /100 wbc   Anion Gap    Collection Time: 01/12/23 3:49 AM   Result Value Ref Range    Anion Gap 13.0 8.0 - 16.0 meq/L   Glomerular Filtration Rate, Estimated    Collection Time: 01/12/23  3:49 AM   Result Value Ref Range    Est, Glom Filt Rate >60 >60 ml/min/1.73m2   Sodium    Collection Time: 01/12/23  6:29 AM   Result Value Ref Range    Sodium 126 (L) 135 - 145 meq/L   Sodium    Collection Time: 01/12/23  6:29 AM   Result Value Ref Range    Sodium 126 (L) 135 - 145 meq/L   Hepatic Function Panel    Collection Time: 01/12/23  6:29 AM   Result Value Ref Range    Albumin 3.6 3.5 - 5.1 g/dL    Total Bilirubin 1.0 0.3 - 1.2 mg/dL    Bilirubin, Direct <0.2 0.0 - 0.3 mg/dL    Alkaline Phosphatase 45 38 - 126 U/L    AST 22 5 - 40 U/L    ALT 12 11 - 66 U/L    Total Protein 5.9 (L) 6.1 - 8.0 g/dL   Sodium    Collection Time: 01/12/23  8:07 AM   Result Value Ref Range    Sodium 126 (L) 135 - 145 meq/L   Sodium    Collection Time: 01/12/23 10:06 AM   Result Value Ref Range    Sodium 127 (L) 135 - 145 meq/L   Sodium    Collection Time: 01/12/23 12:18 PM   Result Value Ref Range    Sodium 123 (L) 135 - 145 meq/L   Sodium    Collection Time: 01/12/23  3:13 PM   Result Value Ref Range    Sodium 126 (L) 135 - 145 meq/L   Sodium    Collection Time: 01/12/23  6:36 PM   Result Value Ref Range    Sodium 123 (L) 135 - 145 meq/L   Sodium    Collection Time: 01/12/23  9:24 PM   Result Value Ref Range    Sodium 127 (L) 135 - 145 meq/L   Hemoglobin and Hematocrit    Collection Time: 01/12/23  9:24 PM   Result Value Ref Range    Hemoglobin 9.0 (L) 12.0 - 16.0 gm/dl    Hematocrit 26.6 (L) 37.0 - 47.0 %   Sodium    Collection Time: 01/12/23 10:40 PM   Result Value Ref Range    Sodium 128 (L) 135 - 145 meq/L   Sodium    Collection Time: 01/13/23  2:07 AM   Result Value Ref Range    Sodium 130 (L) 135 - 145 meq/L   CBC with Auto Differential    Collection Time: 01/13/23  3:57 AM   Result Value Ref Range    WBC 7.5 4.8 - 10.8 thou/mm3    RBC 2.31 (L) 4.20 - 5.40 mill/mm3    Hemoglobin 8.0 (L) 12.0 - 16.0 gm/dl    Hematocrit 23.2 (L) 37.0 - 47.0 %    .4 (H) 81.0 - 99.0 fL    MCH 34.6 (H) 26.0 - 33.0 pg    MCHC 34.5 32.2 - 35.5 gm/dl    RDW-CV 12.6 11.5 - 14.5 %    RDW-SD 46.3 (H) 35.0 - 45.0 fL    Platelets 469 376 - 893 thou/mm3    MPV 10.0 9.4 - 12.4 fL    Seg Neutrophils 76.1 %    Lymphocytes 7.0 %    Monocytes 15.6 %    Eosinophils 0.7 %    Basophils 0.3 %    Immature Granulocytes 0.3 %    Segs Absolute 5.7 1.8 - 7.7 thou/mm3    Lymphocytes Absolute 0.5 (L) 1.0 - 4.8 thou/mm3    Monocytes Absolute 1.2 0.4 - 1.3 thou/mm3    Eosinophils Absolute 0.1 0.0 - 0.4 thou/mm3    Basophils Absolute 0.0 0.0 - 0.1 thou/mm3    Immature Grans (Abs) 0.02 0.00 - 0.07 thou/mm3    nRBC 0 /100 wbc   Basic Metabolic Panel w/ Reflex to MG    Collection Time: 01/13/23  3:57 AM   Result Value Ref Range    Sodium 130 (L) 135 - 145 meq/L    Potassium reflex Magnesium 3.6 3.5 - 5.2 meq/L    Chloride 94 (L) 98 - 111 meq/L    CO2 23 23 - 33 meq/L    Glucose 102 70 - 108 mg/dL    BUN 11 7 - 22 mg/dL    Creatinine 0.4 0.4 - 1.2 mg/dL    Calcium 7.9 (L) 8.5 - 10.5 mg/dL   Anion Gap    Collection Time: 01/13/23  3:57 AM   Result Value Ref Range    Anion Gap 13.0 8.0 - 16.0 meq/L   Glomerular Filtration Rate, Estimated    Collection Time: 01/13/23  3:57 AM   Result Value Ref Range    Est, Glom Filt Rate >60 >60 ml/min/1.73m2   Reticulocytes    Collection Time: 01/13/23  3:57 AM   Result Value Ref Range    Retic Ct Abs 2.6 (H) 0.5 - 2.0 %    Absolute Retic # 59.0 20.0 - 115.0 thou/mm3    Immature Retic Fract 12.3 3.0 - 15.9 %    Retic Hemoglobin 37.2 (H) 28.2 - 35.7 pg   Iron Binding Capacity    Collection Time: 01/13/23  3:57 AM   Result Value Ref Range    TIBC 223 171 - 450 ug/dL   Ferritin    Collection Time: 01/13/23  3:57 AM   Result Value Ref Range    Ferritin 479 (H) 10 - 291 ng/mL   IRON SATURATION    Collection Time: 01/13/23  3:57 AM   Result Value Ref Range    Iron Saturation 10 (L) 20 - 50 %   Iron    Collection Time: 01/13/23  3:57 AM   Result Value Ref Range    Iron 23 (L) 50 - 170 ug/dL   Sodium    Collection Time: 01/13/23  8:21 AM   Result Value Ref Range    Sodium 131 (L) 135 - 145 meq/L   Vitamin B12 & Folate    Collection Time: 01/13/23  8:21 AM   Result Value Ref Range    Vitamin B-12 1110 (H) 211 - 911 pg/mL    Folate > 20.0 4.8 - 24.2 ng/mL   Sodium    Collection Time: 01/13/23 10:35 AM   Result Value Ref Range    Sodium 133 (L) 135 - 145 meq/L       Discharge condition: stable  Disposition: Home  Time spent on discharge: greater than 50 minutes    Electronically signed by Radha Thibodeaux MD on 1/13/2023 at 7:41 PM

## 2023-01-13 NOTE — PROGRESS NOTES
Orthopaedic Progress Note      SUBJECTIVE   Ms. Cueva sustained a L proximal humerus fracture after a mechanical fall 1/10/23, admitted for seizure activity 1/11/23.   -not amendable to CT scan while in house  -no adverse events overnight  -pain control significantly improved      OBJECTIVE      Physical    VITALS:  /79   Pulse 93   Temp 98.2 °F (36.8 °C) (Oral)   Resp 16   Ht 5' 8\" (1.727 m)   Wt 145 lb 8 oz (66 kg)   SpO2 97%   BMI 22.12 kg/m²   I/O last 3 completed shifts: In: 2148 [I.V.:2047; IV Piggyback:101]  Out: 950 [Urine:950]    GENERAL APPEARANCE: Awake and oriented x4. No acute distress, except appropriate to injury. MOOD AND AFFECT: Calm appropriate to situation  HEAD: normocephalic, atraumatic   EYES: equal and reactive to light, Extraocular movements are normal. Pupils are equal in size. Hematologic/Lymphatic: no lymphadenopathy to upper or lower extremity. MSK:  LUE: atraumatic, swelling with improvement, minimal ecchymosis to chest and posterior compartment, neutral alignment, sling intact. No lacerations or lesions. TTP at proximal humerus, nontender at clavicle, humeral shaft, elbow, forearm, wrist digits. Able to initiate resisted shoulder abduction, painless pronation supination at elbow wrist, initiate flexion extension but with some discomfort at elbow, wrist rom and digital rom without loss or weakness, radial median ulnar ain pin ax motor intact, radial medin ulnar axillary sensation intact.           Data  CBC:   Lab Results   Component Value Date/Time    WBC 7.5 01/13/2023 03:57 AM    HGB 8.0 01/13/2023 03:57 AM     01/13/2023 03:57 AM     BMP:    Lab Results   Component Value Date/Time     01/13/2023 10:35 AM    K 3.6 01/13/2023 03:57 AM    CL 94 01/13/2023 03:57 AM    CO2 23 01/13/2023 03:57 AM    BUN 11 01/13/2023 03:57 AM    CREATININE 0.4 01/13/2023 03:57 AM    CALCIUM 7.9 01/13/2023 03:57 AM    GLUCOSE 102 01/13/2023 03:57 AM    GLUCOSE 95 04/12/2016 10:56 AM     Uric Acid:  No components found for: URIC  PT/INR:  No results found for: PROTIME, INR  Troponin:  No results found for: TROPONINI  Urine Culture:  No components found for: CURINE      Current Inpatient Medications    Current Facility-Administered Medications: sodium chloride tablet 1 g, 1 g, Oral, BID WC  0.9 % sodium chloride infusion, , IntraVENous, Continuous  aspirin EC tablet 81 mg, 81 mg, Oral, Daily  carvedilol (COREG) tablet 3.125 mg, 3.125 mg, Oral, BID WC  ferrous sulfate (IRON 325) tablet 325 mg, 325 mg, Oral, Once per day on Mon Wed Fri  cetirizine (ZYRTEC) tablet 10 mg, 10 mg, Oral, Daily  isosorbide mononitrate (IMDUR) extended release tablet 15 mg, 15 mg, Oral, Daily  sodium chloride flush 0.9 % injection 5-40 mL, 5-40 mL, IntraVENous, 2 times per day  sodium chloride flush 0.9 % injection 5-40 mL, 5-40 mL, IntraVENous, PRN  0.9 % sodium chloride infusion, , IntraVENous, PRN  [Held by provider] enoxaparin (LOVENOX) injection 40 mg, 40 mg, SubCUTAneous, Q24H  ondansetron (ZOFRAN-ODT) disintegrating tablet 4 mg, 4 mg, Oral, Q8H PRN **OR** ondansetron (ZOFRAN) injection 4 mg, 4 mg, IntraVENous, Q6H PRN  polyethylene glycol (GLYCOLAX) packet 17 g, 17 g, Oral, Daily PRN  acetaminophen (TYLENOL) tablet 650 mg, 650 mg, Oral, Q6H PRN **OR** acetaminophen (TYLENOL) suppository 650 mg, 650 mg, Rectal, Q6H PRN  potassium chloride (KLOR-CON M) extended release tablet 40 mEq, 40 mEq, Oral, PRN **OR** potassium bicarb-citric acid (EFFER-K) effervescent tablet 40 mEq, 40 mEq, Oral, PRN **OR** potassium chloride 10 mEq/100 mL IVPB (Peripheral Line), 10 mEq, IntraVENous, PRN  magnesium sulfate 2000 mg in 50 mL IVPB premix, 2,000 mg, IntraVENous, PRN  ketorolac (TORADOL) injection 15 mg, 15 mg, IntraVENous, Q6H PRN  LORazepam (ATIVAN) injection 1 mg, 1 mg, IntraVENous, Q5 Min PRN  atorvastatin (LIPITOR) tablet 20 mg, 20 mg, Oral, Nightly  levETIRAcetam (KEPPRA) tablet 750 mg, 750 mg, Oral, BID        PLAN    Ms. [place] Drew sustained a L proximal humerus fracture after a mechanical fall 1/10/23, admitted for seizure activity 1/11/23.      -okay to discharge from ortho standpoint  -ice prn  -patient refusal CT scan while in house, alternatives to this discussed, expressed understanding  -NWB LUE, maintain sling, elbow wrist digital ROM okay  -discharge with follow up with Dr Jordon Colmenares on 1/16/23 7am, patient aware

## 2023-01-13 NOTE — PROGRESS NOTES
Patient had CT ordered upon arrival to the unit before handoff. Educated patient on the need to have the CT to be evaluated for Surgery. Patient choose to refuse at this time and wants to talk to the doctors about it more in the morning.

## 2023-01-13 NOTE — DISCHARGE INSTRUCTIONS
You have suffered a seizure episode. These events can cause a loss of consciousness unexpectedly. For the next six months, do not drive, operative heavy machinery, take unattended baths or swims, or otherwise perform any activity in which loss of consciousness could cause harm to yourself or others. These restrictions can be released if deemed appropriate by your Neurologist.       1500 ml fluid restriction daily until seen by Nephrology. F/up with Neurology in 2-3 weeks.      Ortho follow up on 1/16/23

## 2023-01-13 NOTE — CARE COORDINATION
1/13/23, 12:43 PM EST    Patient goals/plan/ treatment preferences discussed by  and . Patient goals/plan/ treatment preferences reviewed with patient/ family. Patient/ family verbalize understanding of discharge plan and are in agreement with goal/plan/treatment preferences. Understanding was demonstrated using the teach back method. AVS provided by RN at time of discharge, which includes all necessary medical information pertaining to the patients current course of illness, treatment, post-discharge goals of care, and treatment preferences. Services At/After Discharge: None       IMM Letter  IMM Letter given to Patient/Family/Significant other/Guardian/POA/by[de-identified] MARTHA Lopez  IMM Letter date given[de-identified] 01/13/23  IMM Letter time given[de-identified] 0900      No needs voiced for additional discharge DME/therapy.

## 2023-01-13 NOTE — PROGRESS NOTES
Reviewed discharge instructions with pt including f/u appts and medication education. Pt understands what medications to stop and when f/u appts are scheduled.  at bedside and also verbalized understanding.